# Patient Record
Sex: FEMALE | Race: WHITE | HISPANIC OR LATINO | URBAN - METROPOLITAN AREA
[De-identification: names, ages, dates, MRNs, and addresses within clinical notes are randomized per-mention and may not be internally consistent; named-entity substitution may affect disease eponyms.]

---

## 2018-02-23 ENCOUNTER — OUTPATIENT (OUTPATIENT)
Dept: OUTPATIENT SERVICES | Facility: HOSPITAL | Age: 58
LOS: 1 days | Discharge: HOME | End: 2018-02-23

## 2018-02-26 DIAGNOSIS — R89.7 ABNORMAL HISTOLOGICAL FINDINGS IN SPECIMENS FROM OTHER ORGANS, SYSTEMS AND TISSUES: ICD-10-CM

## 2018-03-02 ENCOUNTER — RESULT REVIEW (OUTPATIENT)
Age: 58
End: 2018-03-02

## 2018-12-14 ENCOUNTER — EMERGENCY (EMERGENCY)
Facility: HOSPITAL | Age: 58
LOS: 0 days | Discharge: HOME | End: 2018-12-14
Attending: EMERGENCY MEDICINE | Admitting: EMERGENCY MEDICINE

## 2018-12-14 VITALS
DIASTOLIC BLOOD PRESSURE: 90 MMHG | SYSTOLIC BLOOD PRESSURE: 140 MMHG | OXYGEN SATURATION: 100 % | TEMPERATURE: 98 F | RESPIRATION RATE: 18 BRPM | HEART RATE: 72 BPM

## 2018-12-14 VITALS
SYSTOLIC BLOOD PRESSURE: 120 MMHG | HEART RATE: 62 BPM | DIASTOLIC BLOOD PRESSURE: 65 MMHG | TEMPERATURE: 97 F | OXYGEN SATURATION: 100 % | RESPIRATION RATE: 18 BRPM

## 2018-12-14 DIAGNOSIS — I10 ESSENTIAL (PRIMARY) HYPERTENSION: ICD-10-CM
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
## 2018-12-14 DIAGNOSIS — S00.83XA CONTUSION OF OTHER PART OF HEAD, INITIAL ENCOUNTER: ICD-10-CM

## 2018-12-14 DIAGNOSIS — R55 SYNCOPE AND COLLAPSE: ICD-10-CM

## 2018-12-14 DIAGNOSIS — R19.7 DIARRHEA, UNSPECIFIED: ICD-10-CM

## 2018-12-14 DIAGNOSIS — Z91.013 ALLERGY TO SEAFOOD: ICD-10-CM

## 2018-12-14 DIAGNOSIS — R11.2 NAUSEA WITH VOMITING, UNSPECIFIED: ICD-10-CM
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
## 2018-12-14 DIAGNOSIS — S00.33XA CONTUSION OF NOSE, INITIAL ENCOUNTER: ICD-10-CM

## 2018-12-14 LAB
ALBUMIN SERPL ELPH-MCNC: 4.8 G/DL — SIGNIFICANT CHANGE UP (ref 3.5–5.2)
ALP SERPL-CCNC: 90 U/L — SIGNIFICANT CHANGE UP (ref 30–115)
ALT FLD-CCNC: 26 U/L — SIGNIFICANT CHANGE UP (ref 0–41)
ANION GAP SERPL CALC-SCNC: 17 MMOL/L — HIGH (ref 7–14)
AST SERPL-CCNC: 38 U/L — SIGNIFICANT CHANGE UP (ref 0–41)
BASOPHILS # BLD AUTO: 0.04 K/UL — SIGNIFICANT CHANGE UP (ref 0–0.2)
BASOPHILS NFR BLD AUTO: 0.3 % — SIGNIFICANT CHANGE UP (ref 0–1)
BILIRUB SERPL-MCNC: <0.2 MG/DL — SIGNIFICANT CHANGE UP (ref 0.2–1.2)
BUN SERPL-MCNC: 20 MG/DL — SIGNIFICANT CHANGE UP (ref 10–20)
CALCIUM SERPL-MCNC: 10 MG/DL — SIGNIFICANT CHANGE UP (ref 8.5–10.1)
CHLORIDE SERPL-SCNC: 100 MMOL/L — SIGNIFICANT CHANGE UP (ref 98–110)
CO2 SERPL-SCNC: 25 MMOL/L — SIGNIFICANT CHANGE UP (ref 17–32)
CREAT SERPL-MCNC: 0.9 MG/DL — SIGNIFICANT CHANGE UP (ref 0.7–1.5)
EOSINOPHIL # BLD AUTO: 0.1 K/UL — SIGNIFICANT CHANGE UP (ref 0–0.7)
EOSINOPHIL NFR BLD AUTO: 0.8 % — SIGNIFICANT CHANGE UP (ref 0–8)
GLUCOSE SERPL-MCNC: 121 MG/DL — HIGH (ref 70–99)
HCT VFR BLD CALC: 41.5 % — SIGNIFICANT CHANGE UP (ref 37–47)
HGB BLD-MCNC: 14 G/DL — SIGNIFICANT CHANGE UP (ref 12–16)
IMM GRANULOCYTES NFR BLD AUTO: 0.4 % — HIGH (ref 0.1–0.3)
LIDOCAIN IGE QN: 48 U/L — SIGNIFICANT CHANGE UP (ref 7–60)
LYMPHOCYTES # BLD AUTO: 18.6 % — LOW (ref 20.5–51.1)
LYMPHOCYTES # BLD AUTO: 2.46 K/UL — SIGNIFICANT CHANGE UP (ref 1.2–3.4)
MCHC RBC-ENTMCNC: 29.3 PG — SIGNIFICANT CHANGE UP (ref 27–31)
MCHC RBC-ENTMCNC: 33.7 G/DL — SIGNIFICANT CHANGE UP (ref 32–37)
MCV RBC AUTO: 86.8 FL — SIGNIFICANT CHANGE UP (ref 81–99)
MONOCYTES # BLD AUTO: 0.54 K/UL — SIGNIFICANT CHANGE UP (ref 0.1–0.6)
MONOCYTES NFR BLD AUTO: 4.1 % — SIGNIFICANT CHANGE UP (ref 1.7–9.3)
NEUTROPHILS # BLD AUTO: 10.07 K/UL — HIGH (ref 1.4–6.5)
NEUTROPHILS NFR BLD AUTO: 75.8 % — HIGH (ref 42.2–75.2)
NRBC # BLD: 0 /100 WBCS — SIGNIFICANT CHANGE UP (ref 0–0)
PLATELET # BLD AUTO: 215 K/UL — SIGNIFICANT CHANGE UP (ref 130–400)
POTASSIUM SERPL-MCNC: 4 MMOL/L — SIGNIFICANT CHANGE UP (ref 3.5–5)
POTASSIUM SERPL-SCNC: 4 MMOL/L — SIGNIFICANT CHANGE UP (ref 3.5–5)
PROT SERPL-MCNC: 7.6 G/DL — SIGNIFICANT CHANGE UP (ref 6–8)
RBC # BLD: 4.78 M/UL — SIGNIFICANT CHANGE UP (ref 4.2–5.4)
RBC # FLD: 12.7 % — SIGNIFICANT CHANGE UP (ref 11.5–14.5)
SODIUM SERPL-SCNC: 142 MMOL/L — SIGNIFICANT CHANGE UP (ref 135–146)
TROPONIN T SERPL-MCNC: <0.01 NG/ML — SIGNIFICANT CHANGE UP
WBC # BLD: 13.26 K/UL — HIGH (ref 4.8–10.8)
WBC # FLD AUTO: 13.26 K/UL — HIGH (ref 4.8–10.8)

## 2018-12-14 RX ORDER — SODIUM CHLORIDE 9 MG/ML
1000 INJECTION INTRAMUSCULAR; INTRAVENOUS; SUBCUTANEOUS ONCE
Qty: 0 | Refills: 0 | Status: COMPLETED | OUTPATIENT
Start: 2018-12-14 | End: 2018-12-14

## 2018-12-14 RX ORDER — ONDANSETRON 8 MG/1
4 TABLET, FILM COATED ORAL ONCE
Qty: 0 | Refills: 0 | Status: COMPLETED | OUTPATIENT
Start: 2018-12-14 | End: 2018-12-14

## 2018-12-14 RX ORDER — ACETAMINOPHEN 500 MG
650 TABLET ORAL ONCE
Qty: 0 | Refills: 0 | Status: COMPLETED | OUTPATIENT
Start: 2018-12-14 | End: 2018-12-14

## 2018-12-14 RX ADMIN — ONDANSETRON 4 MILLIGRAM(S): 8 TABLET, FILM COATED ORAL at 18:28

## 2018-12-14 RX ADMIN — SODIUM CHLORIDE 1000 MILLILITER(S): 9 INJECTION INTRAMUSCULAR; INTRAVENOUS; SUBCUTANEOUS at 18:28

## 2018-12-14 RX ADMIN — Medication 650 MILLIGRAM(S): at 18:28

## 2018-12-14 NOTE — ED PROVIDER NOTE - MEDICAL DECISION MAKING DETAILS
pt here with syncopal episode after what she believes to be food poisoning.  pt states that other people who ate the food also got sick.    Pt reports diarrhea, abd crampingin and then feeling lightheaded and had syncopal episode and vomiting.  no cp, no sob.  While in the ED pt had NO Symptoms.  no cp, no sob, no abd pain, no headache, no back pain, no numbness, no weakness.  Pt hit her head.  CThead negative.  pt not on anticoagulation.  pt feeling much better.  pt ambulating around the ED.  work up negative for anything acute.  pt DC with outpatient follow up

## 2018-12-14 NOTE — ED PROVIDER NOTE - OBJECTIVE STATEMENT
58 year old female with pmhx of HTN, presents with abdominal cramping and diarrhea s/.p eating out at [employer]. Pt admits had lunch, went kourtney to office, and had sudden diarrhea in bathroom. Pt admits felt lightheaded after bowel movement and passed out, hit front of face. pt admits to few episodes of vomiting after syncope. Pt admits other workers with GI issues too after lunch. Pt denies fever, chills, CP, SOB, or urinary symptoms.

## 2018-12-14 NOTE — ED PROVIDER NOTE - PHYSICAL EXAMINATION
CONST: Well appearing in NAD  EYES: PERRL, EOMI, Sclera and conjunctiva clear.   ENT: No nasal discharge. Oropharynx normal appearing, no erythema or exudates. No abscess or swelling. Uvula midline. No temporal artery or mastoid tenderness.  NECK: Non-tender, no meningeal signs. normal ROM. supple   CARD: Normal S1 S2; Normal rate and rhythm  RESP: Equal BS B/L, No wheezes, rhonchi or rales. No distress  GI: Soft, non-tender, non-distended. no cva tenderness. normal BS  MS: Normal ROM in all extremities. No midline spinal tenderness. pulses 2 +. no calf tenderness or swelling  SKIN: + small bruise to forehead and nose   NEURO: A&Ox3, No focal deficits. Strength 5/5 with no sensory deficits. Steady gait. Finger to nose intact. Negative pronator drift

## 2018-12-14 NOTE — ED PROVIDER NOTE - NS ED ROS FT
Review of Systems:  	•	CONSTITUTIONAL - no fever, no diaphoresis, no chills  	•	SKIN - no rash  	•	HEMATOLOGIC - no bleeding, no bruising  	•	EYES - no eye pain, no blurry vision  	•	ENT - no change in hearing, no sore throat, no ear pain or tinnitus  	•	RESPIRATORY - no shortness of breath, no cough  	•	CARDIAC - no chest pain, no palpitations  	•	GI - no abd pain, no nausea, + vomiting, + diarrhea, no constipation  	•	GENITO-URINARY - no discharge, no dysuria; no hematuria, no increased urinary frequency  	•	MUSCULOSKELETAL - no joint paint, no swelling, no redness  	•	NEUROLOGIC - no weakness, no headache, no paresthesias

## 2018-12-14 NOTE — ED PROVIDER NOTE - ATTENDING CONTRIBUTION TO CARE
59 yo f presents s/p syncopal episode pt reports she got lunch and shortly after lunch had episode of abd cramping, diarrhea, sweating, vomiting and then fewlt lightheadaded and had syncopal episode.  pt hit her head. no numbness, no weakness.  pt is on aspirin.  no other blood thinners.  currently pt feels fine.  no cp, nos ob, no abd pain, no back pain, no nausea, no vomiting, no fevers, no chills.  no numbness, no weakness.  pt has NO ABD PAIN.  awake, alert.  neck supple.  no midline vertebral tenderness.  eomi.  motor/;sensaiton intact in all 4 ext.  ecchymosis to forehead and nose.  no epistaxis, no malocclusion.  no active bleeding.  abd soft, nontender.  lungs clear.  a/p:  vasovagal syncope and head injury.  p:  ct, labs, cxr, ekg, ivf, reassess.

## 2018-12-14 NOTE — ED ADULT TRIAGE NOTE - CHIEF COMPLAINT QUOTE
Pt BIBA was out to eat became suddenly ill, diarrhea & vomiting, passed out, hit her head. Not on any blood thinners, feeling better now.

## 2019-05-22 ENCOUNTER — RESULT REVIEW (OUTPATIENT)
Age: 59
End: 2019-05-22

## 2019-05-31 NOTE — ED ADULT NURSE NOTE - PAIN: BODY LOCATION
HISTORY OF PRESENT ILLNESS  Carine Landon is a 37 y.o. female. Pt. comes in for f/u. Has multiple medical problems. Continues to have chronic bilateral knee pain with arthritis. Followed by orthopedic MD who has recommended surgery. Also has chronic back pain with lumbar arthritis. Reports a lot of joint stiffness and muscle aches as well. Relates all of this to trauma and repetitive work when she was back in [place]. Continues to have some GERD issues. Protonix helps. Has chronic depression and PTSD. She is . Has young kids. Does not speak [place]. Came here as a refugee. Has been working but it denies tobacco or alcohol use. Is very difficult for her to be standing for long periods of time or walk. Reports compliance with medications and diet. Med list and most recent labs/studies reviewed with pt. Trying to be active physically to as tolerated . Reports no other new c/o. HPI    Review of Systems   Constitutional: Negative. HENT: Negative. Eyes: Negative. Respiratory: Negative for shortness of breath. Cardiovascular: Negative for chest pain and leg swelling. Gastrointestinal: Positive for heartburn. Negative for abdominal pain. Genitourinary: Negative for dysuria and frequency. Musculoskeletal: Positive for back pain, joint pain and myalgias. Negative for falls and neck pain. Skin: Negative. Neurological: Negative for dizziness, sensory change, focal weakness and headaches. Psychiatric/Behavioral: Positive for depression. The patient is nervous/anxious and has insomnia. All other systems reviewed and are negative. Physical Exam   Constitutional: She is oriented to person, place, and time. She appears well-developed and well-nourished. No distress. HENT:   Head: Normocephalic and atraumatic. Mouth/Throat: Oropharynx is clear and moist.   Eyes: Conjunctivae are normal.   Neck: Normal range of motion. Neck supple. No JVD present. No thyromegaly present. Cardiovascular: Normal rate, regular rhythm and intact distal pulses. No murmur heard. Pulmonary/Chest: Effort normal and breath sounds normal. No respiratory distress. She has no wheezes. She has no rales. Abdominal: Soft. Bowel sounds are normal. She exhibits no distension. There is no tenderness. Musculoskeletal: She exhibits tenderness (Bilateral knees, lumbars, chronic). She exhibits no edema. DJD   Neurological: She is alert and oriented to person, place, and time. Coordination normal.   Skin: Skin is warm and dry. No rash noted. Psychiatric: Her behavior is normal.   Chronically depressed/stressed   Nursing note and vitals reviewed. ASSESSMENT and PLAN  Diagnoses and all orders for this visit:    1. Chronic pain of both knees    2. Arthritis of lumbar spine    3. Chronic midline back pain, unspecified back location    4. Gastroesophageal reflux disease without esophagitis    5. PTSD (post-traumatic stress disorder)      Follow-up and Dispositions    · Return in about 3 months (around 8/14/2019).      lab results and schedule of future lab studies reviewed with patient  reviewed diet, exercise and weight control  reviewed medications and side effects in detail  F/u with other MD's as scheduled  She is medically stable for knee surgery face

## 2019-07-23 ENCOUNTER — OUTPATIENT (OUTPATIENT)
Dept: OUTPATIENT SERVICES | Facility: HOSPITAL | Age: 59
LOS: 1 days | Discharge: HOME | End: 2019-07-23
Payer: OTHER MISCELLANEOUS

## 2019-07-23 VITALS
TEMPERATURE: 98 F | DIASTOLIC BLOOD PRESSURE: 78 MMHG | SYSTOLIC BLOOD PRESSURE: 118 MMHG | OXYGEN SATURATION: 100 % | HEART RATE: 67 BPM | RESPIRATION RATE: 16 BRPM | WEIGHT: 201.94 LBS | HEIGHT: 64 IN

## 2019-07-23 DIAGNOSIS — M17.12 UNILATERAL PRIMARY OSTEOARTHRITIS, LEFT KNEE: ICD-10-CM

## 2019-07-23 DIAGNOSIS — J34.2 DEVIATED NASAL SEPTUM: Chronic | ICD-10-CM

## 2019-07-23 DIAGNOSIS — Z98.890 OTHER SPECIFIED POSTPROCEDURAL STATES: Chronic | ICD-10-CM

## 2019-07-23 DIAGNOSIS — Z01.818 ENCOUNTER FOR OTHER PREPROCEDURAL EXAMINATION: ICD-10-CM

## 2019-07-23 DIAGNOSIS — Z98.51 TUBAL LIGATION STATUS: Chronic | ICD-10-CM

## 2019-07-23 LAB
ALBUMIN SERPL ELPH-MCNC: 4.5 G/DL — SIGNIFICANT CHANGE UP (ref 3.5–5.2)
ALP SERPL-CCNC: 74 U/L — SIGNIFICANT CHANGE UP (ref 30–115)
ALT FLD-CCNC: 24 U/L — SIGNIFICANT CHANGE UP (ref 0–41)
ANION GAP SERPL CALC-SCNC: 12 MMOL/L — SIGNIFICANT CHANGE UP (ref 7–14)
APPEARANCE UR: CLEAR — SIGNIFICANT CHANGE UP
APTT BLD: 36.4 SEC — SIGNIFICANT CHANGE UP (ref 27–39.2)
AST SERPL-CCNC: 34 U/L — SIGNIFICANT CHANGE UP (ref 0–41)
BASOPHILS # BLD AUTO: 0.06 K/UL — SIGNIFICANT CHANGE UP (ref 0–0.2)
BASOPHILS NFR BLD AUTO: 0.8 % — SIGNIFICANT CHANGE UP (ref 0–1)
BILIRUB SERPL-MCNC: 0.3 MG/DL — SIGNIFICANT CHANGE UP (ref 0.2–1.2)
BILIRUB UR-MCNC: NEGATIVE — SIGNIFICANT CHANGE UP
BLD GP AB SCN SERPL QL: SIGNIFICANT CHANGE UP
BUN SERPL-MCNC: 16 MG/DL — SIGNIFICANT CHANGE UP (ref 10–20)
CALCIUM SERPL-MCNC: 9.8 MG/DL — SIGNIFICANT CHANGE UP (ref 8.5–10.1)
CHLORIDE SERPL-SCNC: 103 MMOL/L — SIGNIFICANT CHANGE UP (ref 98–110)
CO2 SERPL-SCNC: 26 MMOL/L — SIGNIFICANT CHANGE UP (ref 17–32)
COLOR SPEC: YELLOW — SIGNIFICANT CHANGE UP
CREAT SERPL-MCNC: 0.9 MG/DL — SIGNIFICANT CHANGE UP (ref 0.7–1.5)
DIFF PNL FLD: NEGATIVE — SIGNIFICANT CHANGE UP
EOSINOPHIL # BLD AUTO: 0.22 K/UL — SIGNIFICANT CHANGE UP (ref 0–0.7)
EOSINOPHIL NFR BLD AUTO: 3.1 % — SIGNIFICANT CHANGE UP (ref 0–8)
ESTIMATED AVERAGE GLUCOSE: 117 MG/DL — HIGH (ref 68–114)
GLUCOSE SERPL-MCNC: 105 MG/DL — HIGH (ref 70–99)
GLUCOSE UR QL: NEGATIVE — SIGNIFICANT CHANGE UP
HBA1C BLD-MCNC: 5.7 % — HIGH (ref 4–5.6)
HCT VFR BLD CALC: 40.8 % — SIGNIFICANT CHANGE UP (ref 37–47)
HGB BLD-MCNC: 13.8 G/DL — SIGNIFICANT CHANGE UP (ref 12–16)
IMM GRANULOCYTES NFR BLD AUTO: 0.3 % — SIGNIFICANT CHANGE UP (ref 0.1–0.3)
INR BLD: 1.05 RATIO — SIGNIFICANT CHANGE UP (ref 0.65–1.3)
KETONES UR-MCNC: NEGATIVE — SIGNIFICANT CHANGE UP
LEUKOCYTE ESTERASE UR-ACNC: NEGATIVE — SIGNIFICANT CHANGE UP
LYMPHOCYTES # BLD AUTO: 2.65 K/UL — SIGNIFICANT CHANGE UP (ref 1.2–3.4)
LYMPHOCYTES # BLD AUTO: 37.5 % — SIGNIFICANT CHANGE UP (ref 20.5–51.1)
MCHC RBC-ENTMCNC: 29.9 PG — SIGNIFICANT CHANGE UP (ref 27–31)
MCHC RBC-ENTMCNC: 33.8 G/DL — SIGNIFICANT CHANGE UP (ref 32–37)
MCV RBC AUTO: 88.3 FL — SIGNIFICANT CHANGE UP (ref 81–99)
MONOCYTES # BLD AUTO: 0.5 K/UL — SIGNIFICANT CHANGE UP (ref 0.1–0.6)
MONOCYTES NFR BLD AUTO: 7.1 % — SIGNIFICANT CHANGE UP (ref 1.7–9.3)
MRSA PCR RESULT.: NEGATIVE — SIGNIFICANT CHANGE UP
NEUTROPHILS # BLD AUTO: 3.62 K/UL — SIGNIFICANT CHANGE UP (ref 1.4–6.5)
NEUTROPHILS NFR BLD AUTO: 51.2 % — SIGNIFICANT CHANGE UP (ref 42.2–75.2)
NITRITE UR-MCNC: NEGATIVE — SIGNIFICANT CHANGE UP
NRBC # BLD: 0 /100 WBCS — SIGNIFICANT CHANGE UP (ref 0–0)
PH UR: 6 — SIGNIFICANT CHANGE UP (ref 5–8)
PLATELET # BLD AUTO: 207 K/UL — SIGNIFICANT CHANGE UP (ref 130–400)
POTASSIUM SERPL-MCNC: 4.1 MMOL/L — SIGNIFICANT CHANGE UP (ref 3.5–5)
POTASSIUM SERPL-SCNC: 4.1 MMOL/L — SIGNIFICANT CHANGE UP (ref 3.5–5)
PROT SERPL-MCNC: 7.4 G/DL — SIGNIFICANT CHANGE UP (ref 6–8)
PROT UR-MCNC: SIGNIFICANT CHANGE UP
PROTHROM AB SERPL-ACNC: 12.1 SEC — SIGNIFICANT CHANGE UP (ref 9.95–12.87)
RBC # BLD: 4.62 M/UL — SIGNIFICANT CHANGE UP (ref 4.2–5.4)
RBC # FLD: 12.5 % — SIGNIFICANT CHANGE UP (ref 11.5–14.5)
SODIUM SERPL-SCNC: 141 MMOL/L — SIGNIFICANT CHANGE UP (ref 135–146)
SP GR SPEC: 1.03 — HIGH (ref 1.01–1.02)
UROBILINOGEN FLD QL: SIGNIFICANT CHANGE UP
WBC # BLD: 7.07 K/UL — SIGNIFICANT CHANGE UP (ref 4.8–10.8)
WBC # FLD AUTO: 7.07 K/UL — SIGNIFICANT CHANGE UP (ref 4.8–10.8)

## 2019-07-23 PROCEDURE — 73560 X-RAY EXAM OF KNEE 1 OR 2: CPT | Mod: 26,LT

## 2019-07-23 PROCEDURE — 93010 ELECTROCARDIOGRAM REPORT: CPT

## 2019-07-23 PROCEDURE — 71046 X-RAY EXAM CHEST 2 VIEWS: CPT | Mod: 26

## 2019-07-23 NOTE — H&P PST ADULT - RS GEN PE MLT RESP DETAILS PC
airway patent/breath sounds equal/normal/respirations non-labored/no chest wall tenderness/good air movement/clear to auscultation bilaterally/no intercostal retractions

## 2019-07-23 NOTE — H&P PST ADULT - ATTENDING COMMENTS
Risks, benefits and alternative to surgical management of the patient's arthritic left knee were again reviewed with patient, her questions answered to her satisfaction and she wishes to proceed with proposed surgery today.

## 2019-07-23 NOTE — H&P PST ADULT - REASON FOR ADMISSION
PT PRESENTS TO PAST WITH NO SOB, CP, PALPITATIONS, DYSURIA, UTI OR URI AT PRESENT.   PT ABLE TO WALK UP 2-3 FLIGHTS OF STEPS WITH NO SOB.  AS PER THE PT, THIS IS HIS/HER COMPLETE MEDICAL AND SURGICAL HX, INCLUDING MEDICATIONS PRESCRIBED AND OVER THE COUNTER  PT PRESENTS TO PAST WITH H/O-    LEFT KNEE OA

## 2019-07-23 NOTE — H&P PST ADULT - NSICDXPASTMEDICALHX_GEN_ALL_CORE_FT
PAST MEDICAL HISTORY:  Backache symptom     DM (diabetes mellitus)     H/O hiatal hernia     Hypertension     Mild asthma LAST EPISODE 1 YEAR AGP    Murmur, heart CHILDHOOD    OA (osteoarthritis)

## 2019-07-23 NOTE — H&P PST ADULT - NSICDXPASTSURGICALHX_GEN_ALL_CORE_FT
PAST SURGICAL HISTORY:  Deviated septum     H/O arthroscopic knee surgery B/L    H/O carpal tunnel repair B/L    H/O tubal ligation

## 2019-07-24 LAB
CULTURE RESULTS: SIGNIFICANT CHANGE UP
SPECIMEN SOURCE: SIGNIFICANT CHANGE UP

## 2019-08-12 ENCOUNTER — INPATIENT (INPATIENT)
Facility: HOSPITAL | Age: 59
LOS: 1 days | Discharge: HOME HEALTH | End: 2019-08-14
Attending: ORTHOPAEDIC SURGERY | Admitting: ORTHOPAEDIC SURGERY
Payer: OTHER MISCELLANEOUS

## 2019-08-12 ENCOUNTER — RESULT REVIEW (OUTPATIENT)
Age: 59
End: 2019-08-12

## 2019-08-12 VITALS
WEIGHT: 201.94 LBS | RESPIRATION RATE: 18 BRPM | DIASTOLIC BLOOD PRESSURE: 82 MMHG | HEIGHT: 64.5 IN | SYSTOLIC BLOOD PRESSURE: 155 MMHG | HEART RATE: 66 BPM | TEMPERATURE: 98 F

## 2019-08-12 DIAGNOSIS — J34.2 DEVIATED NASAL SEPTUM: Chronic | ICD-10-CM

## 2019-08-12 DIAGNOSIS — Z90.89 ACQUIRED ABSENCE OF OTHER ORGANS: Chronic | ICD-10-CM

## 2019-08-12 DIAGNOSIS — Z98.890 OTHER SPECIFIED POSTPROCEDURAL STATES: Chronic | ICD-10-CM

## 2019-08-12 DIAGNOSIS — Z98.51 TUBAL LIGATION STATUS: Chronic | ICD-10-CM

## 2019-08-12 LAB
GLUCOSE BLDC GLUCOMTR-MCNC: 101 MG/DL — HIGH (ref 70–99)
GLUCOSE BLDC GLUCOMTR-MCNC: 105 MG/DL — HIGH (ref 70–99)
GLUCOSE BLDC GLUCOMTR-MCNC: 113 MG/DL — HIGH (ref 70–99)
GLUCOSE BLDC GLUCOMTR-MCNC: 99 MG/DL — SIGNIFICANT CHANGE UP (ref 70–99)

## 2019-08-12 PROCEDURE — 88311 DECALCIFY TISSUE: CPT | Mod: 26

## 2019-08-12 PROCEDURE — 88305 TISSUE EXAM BY PATHOLOGIST: CPT | Mod: 26

## 2019-08-12 PROCEDURE — 73560 X-RAY EXAM OF KNEE 1 OR 2: CPT | Mod: 26,LT

## 2019-08-12 RX ORDER — ONDANSETRON 8 MG/1
4 TABLET, FILM COATED ORAL EVERY 6 HOURS
Refills: 0 | Status: DISCONTINUED | OUTPATIENT
Start: 2019-08-12 | End: 2019-08-14

## 2019-08-12 RX ORDER — METOPROLOL TARTRATE 50 MG
100 TABLET ORAL
Refills: 0 | Status: DISCONTINUED | OUTPATIENT
Start: 2019-08-13 | End: 2019-08-14

## 2019-08-12 RX ORDER — TRAMADOL HYDROCHLORIDE 50 MG/1
50 TABLET ORAL EVERY 4 HOURS
Refills: 0 | Status: DISCONTINUED | OUTPATIENT
Start: 2019-08-12 | End: 2019-08-14

## 2019-08-12 RX ORDER — ONDANSETRON 8 MG/1
4 TABLET, FILM COATED ORAL ONCE
Refills: 0 | Status: ON HOLD | OUTPATIENT
Start: 2019-08-12

## 2019-08-12 RX ORDER — HYDROCHLOROTHIAZIDE 25 MG
12.5 TABLET ORAL DAILY
Refills: 0 | Status: DISCONTINUED | OUTPATIENT
Start: 2019-08-13 | End: 2019-08-14

## 2019-08-12 RX ORDER — ENOXAPARIN SODIUM 100 MG/ML
40 INJECTION SUBCUTANEOUS DAILY
Refills: 0 | Status: DISCONTINUED | OUTPATIENT
Start: 2019-08-13 | End: 2019-08-14

## 2019-08-12 RX ORDER — LISINOPRIL 2.5 MG/1
40 TABLET ORAL DAILY
Refills: 0 | Status: DISCONTINUED | OUTPATIENT
Start: 2019-08-13 | End: 2019-08-14

## 2019-08-12 RX ORDER — METOPROLOL TARTRATE 50 MG
1 TABLET ORAL
Qty: 0 | Refills: 0 | DISCHARGE

## 2019-08-12 RX ORDER — DEXTROSE 50 % IN WATER 50 %
25 SYRINGE (ML) INTRAVENOUS ONCE
Refills: 0 | Status: DISCONTINUED | OUTPATIENT
Start: 2019-08-12 | End: 2019-08-14

## 2019-08-12 RX ORDER — GLUCAGON INJECTION, SOLUTION 0.5 MG/.1ML
1 INJECTION, SOLUTION SUBCUTANEOUS ONCE
Refills: 0 | Status: DISCONTINUED | OUTPATIENT
Start: 2019-08-12 | End: 2019-08-14

## 2019-08-12 RX ORDER — SENNA PLUS 8.6 MG/1
2 TABLET ORAL AT BEDTIME
Refills: 0 | Status: DISCONTINUED | OUTPATIENT
Start: 2019-08-12 | End: 2019-08-14

## 2019-08-12 RX ORDER — DULAGLUTIDE 4.5 MG/.5ML
0 INJECTION, SOLUTION SUBCUTANEOUS
Qty: 0 | Refills: 0 | DISCHARGE

## 2019-08-12 RX ORDER — SODIUM CHLORIDE 9 MG/ML
1000 INJECTION, SOLUTION INTRAVENOUS
Refills: 0 | Status: DISCONTINUED | OUTPATIENT
Start: 2019-08-12 | End: 2019-08-14

## 2019-08-12 RX ORDER — POLYETHYLENE GLYCOL 3350 17 G/17G
17 POWDER, FOR SOLUTION ORAL DAILY
Refills: 0 | Status: DISCONTINUED | OUTPATIENT
Start: 2019-08-12 | End: 2019-08-14

## 2019-08-12 RX ORDER — CELECOXIB 200 MG/1
400 CAPSULE ORAL ONCE
Refills: 0 | Status: COMPLETED | OUTPATIENT
Start: 2019-08-12 | End: 2019-08-12

## 2019-08-12 RX ORDER — ACETAMINOPHEN 500 MG
1000 TABLET ORAL ONCE
Refills: 0 | Status: COMPLETED | OUTPATIENT
Start: 2019-08-12 | End: 2019-08-12

## 2019-08-12 RX ORDER — GABAPENTIN 400 MG/1
300 CAPSULE ORAL ONCE
Refills: 0 | Status: COMPLETED | OUTPATIENT
Start: 2019-08-12 | End: 2019-08-12

## 2019-08-12 RX ORDER — SODIUM CHLORIDE 9 MG/ML
1000 INJECTION, SOLUTION INTRAVENOUS
Refills: 0 | Status: ON HOLD | OUTPATIENT
Start: 2019-08-12

## 2019-08-12 RX ORDER — DEXTROSE 50 % IN WATER 50 %
12.5 SYRINGE (ML) INTRAVENOUS ONCE
Refills: 0 | Status: DISCONTINUED | OUTPATIENT
Start: 2019-08-12 | End: 2019-08-14

## 2019-08-12 RX ORDER — MAGNESIUM HYDROXIDE 400 MG/1
30 TABLET, CHEWABLE ORAL DAILY
Refills: 0 | Status: DISCONTINUED | OUTPATIENT
Start: 2019-08-12 | End: 2019-08-14

## 2019-08-12 RX ORDER — LISINOPRIL 2.5 MG/1
1 TABLET ORAL
Qty: 0 | Refills: 0 | DISCHARGE

## 2019-08-12 RX ORDER — KETOROLAC TROMETHAMINE 30 MG/ML
15 SYRINGE (ML) INJECTION EVERY 6 HOURS
Refills: 0 | Status: DISCONTINUED | OUTPATIENT
Start: 2019-08-13 | End: 2019-08-13

## 2019-08-12 RX ORDER — GABAPENTIN 400 MG/1
100 CAPSULE ORAL EVERY 8 HOURS
Refills: 0 | Status: DISCONTINUED | OUTPATIENT
Start: 2019-08-12 | End: 2019-08-14

## 2019-08-12 RX ORDER — HYDROMORPHONE HYDROCHLORIDE 2 MG/ML
1 INJECTION INTRAMUSCULAR; INTRAVENOUS; SUBCUTANEOUS
Refills: 0 | Status: ON HOLD | OUTPATIENT
Start: 2019-08-12

## 2019-08-12 RX ORDER — ATORVASTATIN CALCIUM 80 MG/1
80 TABLET, FILM COATED ORAL AT BEDTIME
Refills: 0 | Status: DISCONTINUED | OUTPATIENT
Start: 2019-08-12 | End: 2019-08-14

## 2019-08-12 RX ORDER — CEFAZOLIN SODIUM 1 G
2000 VIAL (EA) INJECTION EVERY 8 HOURS
Refills: 0 | Status: COMPLETED | OUTPATIENT
Start: 2019-08-12 | End: 2019-08-13

## 2019-08-12 RX ORDER — SODIUM CHLORIDE 9 MG/ML
1000 INJECTION, SOLUTION INTRAVENOUS
Refills: 0 | Status: DISCONTINUED | OUTPATIENT
Start: 2019-08-12 | End: 2019-08-12

## 2019-08-12 RX ORDER — ACETAMINOPHEN 500 MG
650 TABLET ORAL EVERY 6 HOURS
Refills: 0 | Status: DISCONTINUED | OUTPATIENT
Start: 2019-08-12 | End: 2019-08-14

## 2019-08-12 RX ORDER — HYDROMORPHONE HYDROCHLORIDE 2 MG/ML
0.5 INJECTION INTRAMUSCULAR; INTRAVENOUS; SUBCUTANEOUS
Refills: 0 | Status: DISCONTINUED | OUTPATIENT
Start: 2019-08-12 | End: 2019-08-12

## 2019-08-12 RX ORDER — HYDROMORPHONE HYDROCHLORIDE 2 MG/ML
0.5 INJECTION INTRAMUSCULAR; INTRAVENOUS; SUBCUTANEOUS
Refills: 0 | Status: ON HOLD | OUTPATIENT
Start: 2019-08-12

## 2019-08-12 RX ORDER — ALBUTEROL 90 UG/1
2 AEROSOL, METERED ORAL EVERY 6 HOURS
Refills: 0 | Status: DISCONTINUED | OUTPATIENT
Start: 2019-08-12 | End: 2019-08-14

## 2019-08-12 RX ORDER — DOCUSATE SODIUM 100 MG
100 CAPSULE ORAL THREE TIMES A DAY
Refills: 0 | Status: DISCONTINUED | OUTPATIENT
Start: 2019-08-12 | End: 2019-08-14

## 2019-08-12 RX ORDER — DEXTROSE 50 % IN WATER 50 %
15 SYRINGE (ML) INTRAVENOUS ONCE
Refills: 0 | Status: DISCONTINUED | OUTPATIENT
Start: 2019-08-12 | End: 2019-08-14

## 2019-08-12 RX ORDER — CHLORHEXIDINE GLUCONATE 213 G/1000ML
1 SOLUTION TOPICAL DAILY
Refills: 0 | Status: DISCONTINUED | OUTPATIENT
Start: 2019-08-12 | End: 2019-08-14

## 2019-08-12 RX ORDER — SODIUM CHLORIDE 9 MG/ML
1000 INJECTION INTRAMUSCULAR; INTRAVENOUS; SUBCUTANEOUS
Refills: 0 | Status: DISCONTINUED | OUTPATIENT
Start: 2019-08-12 | End: 2019-08-13

## 2019-08-12 RX ORDER — ONDANSETRON 8 MG/1
4 TABLET, FILM COATED ORAL ONCE
Refills: 0 | Status: DISCONTINUED | OUTPATIENT
Start: 2019-08-12 | End: 2019-08-12

## 2019-08-12 RX ORDER — INSULIN LISPRO 100/ML
VIAL (ML) SUBCUTANEOUS
Refills: 0 | Status: DISCONTINUED | OUTPATIENT
Start: 2019-08-12 | End: 2019-08-14

## 2019-08-12 RX ORDER — HYDROMORPHONE HYDROCHLORIDE 2 MG/ML
0.25 INJECTION INTRAMUSCULAR; INTRAVENOUS; SUBCUTANEOUS
Refills: 0 | Status: DISCONTINUED | OUTPATIENT
Start: 2019-08-12 | End: 2019-08-12

## 2019-08-12 RX ORDER — CELECOXIB 200 MG/1
200 CAPSULE ORAL DAILY
Refills: 0 | Status: DISCONTINUED | OUTPATIENT
Start: 2019-08-14 | End: 2019-08-14

## 2019-08-12 RX ORDER — PANTOPRAZOLE SODIUM 20 MG/1
40 TABLET, DELAYED RELEASE ORAL
Refills: 0 | Status: DISCONTINUED | OUTPATIENT
Start: 2019-08-12 | End: 2019-08-14

## 2019-08-12 RX ORDER — ROSUVASTATIN CALCIUM 5 MG/1
1 TABLET ORAL
Qty: 0 | Refills: 0 | DISCHARGE

## 2019-08-12 RX ORDER — FERROUS SULFATE 325(65) MG
325 TABLET ORAL
Refills: 0 | Status: DISCONTINUED | OUTPATIENT
Start: 2019-08-12 | End: 2019-08-14

## 2019-08-12 RX ORDER — OXYCODONE HYDROCHLORIDE 5 MG/1
5 TABLET ORAL EVERY 4 HOURS
Refills: 0 | Status: DISCONTINUED | OUTPATIENT
Start: 2019-08-12 | End: 2019-08-14

## 2019-08-12 RX ADMIN — Medication 1000 MILLIGRAM(S): at 11:26

## 2019-08-12 RX ADMIN — HYDROMORPHONE HYDROCHLORIDE 0.5 MILLIGRAM(S): 2 INJECTION INTRAMUSCULAR; INTRAVENOUS; SUBCUTANEOUS at 19:30

## 2019-08-12 RX ADMIN — Medication 100 MILLIGRAM(S): at 21:53

## 2019-08-12 RX ADMIN — HYDROMORPHONE HYDROCHLORIDE 0.5 MILLIGRAM(S): 2 INJECTION INTRAMUSCULAR; INTRAVENOUS; SUBCUTANEOUS at 19:19

## 2019-08-12 RX ADMIN — ATORVASTATIN CALCIUM 80 MILLIGRAM(S): 80 TABLET, FILM COATED ORAL at 21:53

## 2019-08-12 RX ADMIN — CELECOXIB 400 MILLIGRAM(S): 200 CAPSULE ORAL at 11:26

## 2019-08-12 RX ADMIN — OXYCODONE HYDROCHLORIDE 5 MILLIGRAM(S): 5 TABLET ORAL at 22:51

## 2019-08-12 RX ADMIN — SODIUM CHLORIDE 100 MILLILITER(S): 9 INJECTION INTRAMUSCULAR; INTRAVENOUS; SUBCUTANEOUS at 21:00

## 2019-08-12 RX ADMIN — OXYCODONE HYDROCHLORIDE 5 MILLIGRAM(S): 5 TABLET ORAL at 23:20

## 2019-08-12 RX ADMIN — GABAPENTIN 300 MILLIGRAM(S): 400 CAPSULE ORAL at 11:26

## 2019-08-12 RX ADMIN — HYDROMORPHONE HYDROCHLORIDE 0.5 MILLIGRAM(S): 2 INJECTION INTRAMUSCULAR; INTRAVENOUS; SUBCUTANEOUS at 19:03

## 2019-08-12 RX ADMIN — GABAPENTIN 100 MILLIGRAM(S): 400 CAPSULE ORAL at 21:53

## 2019-08-12 RX ADMIN — SODIUM CHLORIDE 100 MILLILITER(S): 9 INJECTION, SOLUTION INTRAVENOUS at 18:30

## 2019-08-12 RX ADMIN — HYDROMORPHONE HYDROCHLORIDE 0.5 MILLIGRAM(S): 2 INJECTION INTRAMUSCULAR; INTRAVENOUS; SUBCUTANEOUS at 19:02

## 2019-08-12 RX ADMIN — HYDROMORPHONE HYDROCHLORIDE 0.5 MILLIGRAM(S): 2 INJECTION INTRAMUSCULAR; INTRAVENOUS; SUBCUTANEOUS at 18:41

## 2019-08-12 NOTE — CHART NOTE - NSCHARTNOTEFT_GEN_A_CORE
PACU ANESTHESIA ADMISSION NOTE      Procedure: Total knee arthroplasty: CPT code 23588    Post op diagnosis:  Left knee DJD      ____  Intubated  TV:______       Rate: ______      FiO2: ______    _x___  Patent Airway    _x___  Full return of protective reflexes    __  Full recovery from anesthesia / back to baseline status    Vitals:  T-98  HR: 71  BP: 122/61  RR: 18 (08-12-19 @ 14:52) (18 - 18)  SpO2: 99    Mental Status:  _x___ Awake   _____ Alert   _____ Drowsy   _____ Sedated    Nausea/Vomiting:  _x___  NO       ______Yes,   See Post - Op Orders         Pain Scale (0-10):  __0___    Treatment: _x___ None    ____ See Post - Op/PCA Orders    Post - Operative Fluids:   __x__ Oral   ____ See Post - Op Orders    Plan: Discharge:   ____Home       ____x_Floor     _____Critical Care    _____  Other:_________________    Comments: Pt bought to RR spontaneously breathing, hemodynamically stable   No anesthesia issues or complications noted.  Discharge when criteria met.

## 2019-08-12 NOTE — ASU PATIENT PROFILE, ADULT - PMH
Backache symptom    DM (diabetes mellitus)    H/O hiatal hernia    Hypertension    Mild asthma  LAST EPISODE 1 YEAR AGP  Murmur, heart  CHILDHOOD  OA (osteoarthritis)

## 2019-08-12 NOTE — ASU PATIENT PROFILE, ADULT - PSH
Deviated septum    H/O arthroscopic knee surgery  B/L  H/O carpal tunnel repair  B/L  H/O tubal ligation    History of tonsillectomy

## 2019-08-12 NOTE — BRIEF OPERATIVE NOTE - OPERATION/FINDINGS
above, post op  - WBAY, Abx and DVT ppx per protocol  Dict:  Implants: Smith and Nephew above,  min at 300 mmHg post op  - WBAY, Abx and DVT ppx per protocol  Dict:  Implants: Smith and Nephew Kiera II size 3 femoral component size 2 tibial tray 11 tibial poly and 29 mm asymetric patella button. above,  min at 300 mmHg post op  - WBAY, Abx and DVT ppx per protocol  Dict:[id number]  Implants: Valiente and Nephew Kiera II size 3 femoral component size 2 tibial tray 11 tibial poly and 29 mm asymetric patella button.

## 2019-08-12 NOTE — PROGRESS NOTE ADULT - SUBJECTIVE AND OBJECTIVE BOX
TKA ORTHOPEDIC POST OP CHECK    T(C): 36.6 (08-12-19 @ 20:30), Max: 36.7 (08-12-19 @ 11:20)  HR: 82 (08-12-19 @ 20:30) (63 - 82)  BP: 150/71 (08-12-19 @ 20:30) (134/65 - 155/82)  RR: 20 (08-12-19 @ 20:30) (11 - 20)  SpO2: 99% (08-12-19 @ 20:30) (98% - 100%)    S/P TKA ARTHROPLASTY  PAIN CONTROLLED  VSS   A&O X3  DRESSING C/D/I  NVI  ANTIBIOTICS INFUSED  DVT PROPHYLAXIS ORDERED  ENCOURAGE INCENTIVE SPIROMETRY  AM LABS  REHAB TO BEGIN IN THE AM  D/C PLANNING

## 2019-08-13 LAB
ANION GAP SERPL CALC-SCNC: 10 MMOL/L — SIGNIFICANT CHANGE UP (ref 7–14)
BUN SERPL-MCNC: 12 MG/DL — SIGNIFICANT CHANGE UP (ref 10–20)
CALCIUM SERPL-MCNC: 8.6 MG/DL — SIGNIFICANT CHANGE UP (ref 8.5–10.1)
CHLORIDE SERPL-SCNC: 100 MMOL/L — SIGNIFICANT CHANGE UP (ref 98–110)
CO2 SERPL-SCNC: 25 MMOL/L — SIGNIFICANT CHANGE UP (ref 17–32)
CREAT SERPL-MCNC: 0.6 MG/DL — LOW (ref 0.7–1.5)
GLUCOSE BLDC GLUCOMTR-MCNC: 106 MG/DL — HIGH (ref 70–99)
GLUCOSE BLDC GLUCOMTR-MCNC: 125 MG/DL — HIGH (ref 70–99)
GLUCOSE BLDC GLUCOMTR-MCNC: 141 MG/DL — HIGH (ref 70–99)
GLUCOSE BLDC GLUCOMTR-MCNC: 149 MG/DL — HIGH (ref 70–99)
GLUCOSE SERPL-MCNC: 118 MG/DL — HIGH (ref 70–99)
HCT VFR BLD CALC: 35 % — LOW (ref 37–47)
HGB BLD-MCNC: 11.6 G/DL — LOW (ref 12–16)
MCHC RBC-ENTMCNC: 29.8 PG — SIGNIFICANT CHANGE UP (ref 27–31)
MCHC RBC-ENTMCNC: 33.1 G/DL — SIGNIFICANT CHANGE UP (ref 32–37)
MCV RBC AUTO: 90 FL — SIGNIFICANT CHANGE UP (ref 81–99)
NRBC # BLD: 0 /100 WBCS — SIGNIFICANT CHANGE UP (ref 0–0)
PLATELET # BLD AUTO: 175 K/UL — SIGNIFICANT CHANGE UP (ref 130–400)
POTASSIUM SERPL-MCNC: 4.4 MMOL/L — SIGNIFICANT CHANGE UP (ref 3.5–5)
POTASSIUM SERPL-SCNC: 4.4 MMOL/L — SIGNIFICANT CHANGE UP (ref 3.5–5)
RBC # BLD: 3.89 M/UL — LOW (ref 4.2–5.4)
RBC # FLD: 12.8 % — SIGNIFICANT CHANGE UP (ref 11.5–14.5)
SODIUM SERPL-SCNC: 135 MMOL/L — SIGNIFICANT CHANGE UP (ref 135–146)
WBC # BLD: 11.27 K/UL — HIGH (ref 4.8–10.8)
WBC # FLD AUTO: 11.27 K/UL — HIGH (ref 4.8–10.8)

## 2019-08-13 RX ADMIN — Medication 325 MILLIGRAM(S): at 08:47

## 2019-08-13 RX ADMIN — OXYCODONE HYDROCHLORIDE 5 MILLIGRAM(S): 5 TABLET ORAL at 02:52

## 2019-08-13 RX ADMIN — Medication 325 MILLIGRAM(S): at 11:17

## 2019-08-13 RX ADMIN — GABAPENTIN 100 MILLIGRAM(S): 400 CAPSULE ORAL at 13:28

## 2019-08-13 RX ADMIN — CHLORHEXIDINE GLUCONATE 1 APPLICATION(S): 213 SOLUTION TOPICAL at 11:18

## 2019-08-13 RX ADMIN — Medication 12.5 MILLIGRAM(S): at 05:14

## 2019-08-13 RX ADMIN — Medication 100 MILLIGRAM(S): at 00:22

## 2019-08-13 RX ADMIN — ONDANSETRON 4 MILLIGRAM(S): 8 TABLET, FILM COATED ORAL at 23:25

## 2019-08-13 RX ADMIN — Medication 15 MILLIGRAM(S): at 17:12

## 2019-08-13 RX ADMIN — Medication 1 TABLET(S): at 11:17

## 2019-08-13 RX ADMIN — Medication 650 MILLIGRAM(S): at 05:14

## 2019-08-13 RX ADMIN — Medication 100 MILLIGRAM(S): at 06:04

## 2019-08-13 RX ADMIN — OXYCODONE HYDROCHLORIDE 5 MILLIGRAM(S): 5 TABLET ORAL at 09:00

## 2019-08-13 RX ADMIN — GABAPENTIN 100 MILLIGRAM(S): 400 CAPSULE ORAL at 05:14

## 2019-08-13 RX ADMIN — OXYCODONE HYDROCHLORIDE 5 MILLIGRAM(S): 5 TABLET ORAL at 03:20

## 2019-08-13 RX ADMIN — Medication 650 MILLIGRAM(S): at 00:23

## 2019-08-13 RX ADMIN — Medication 650 MILLIGRAM(S): at 23:39

## 2019-08-13 RX ADMIN — Medication 100 MILLIGRAM(S): at 13:28

## 2019-08-13 RX ADMIN — ENOXAPARIN SODIUM 40 MILLIGRAM(S): 100 INJECTION SUBCUTANEOUS at 11:18

## 2019-08-13 RX ADMIN — Medication 650 MILLIGRAM(S): at 11:18

## 2019-08-13 RX ADMIN — Medication 15 MILLIGRAM(S): at 23:27

## 2019-08-13 RX ADMIN — ALBUTEROL 2 PUFF(S): 90 AEROSOL, METERED ORAL at 09:01

## 2019-08-13 RX ADMIN — Medication 15 MILLIGRAM(S): at 05:14

## 2019-08-13 RX ADMIN — LISINOPRIL 40 MILLIGRAM(S): 2.5 TABLET ORAL at 05:14

## 2019-08-13 RX ADMIN — Medication 100 MILLIGRAM(S): at 05:14

## 2019-08-13 RX ADMIN — Medication 650 MILLIGRAM(S): at 05:15

## 2019-08-13 RX ADMIN — Medication 325 MILLIGRAM(S): at 17:12

## 2019-08-13 RX ADMIN — Medication 100 MILLIGRAM(S): at 21:05

## 2019-08-13 RX ADMIN — Medication 650 MILLIGRAM(S): at 00:31

## 2019-08-13 RX ADMIN — Medication 15 MILLIGRAM(S): at 11:16

## 2019-08-13 RX ADMIN — Medication 15 MILLIGRAM(S): at 05:15

## 2019-08-13 RX ADMIN — ATORVASTATIN CALCIUM 80 MILLIGRAM(S): 80 TABLET, FILM COATED ORAL at 21:05

## 2019-08-13 RX ADMIN — OXYCODONE HYDROCHLORIDE 5 MILLIGRAM(S): 5 TABLET ORAL at 20:50

## 2019-08-13 RX ADMIN — GABAPENTIN 100 MILLIGRAM(S): 400 CAPSULE ORAL at 21:05

## 2019-08-13 RX ADMIN — POLYETHYLENE GLYCOL 3350 17 GRAM(S): 17 POWDER, FOR SOLUTION ORAL at 11:17

## 2019-08-13 RX ADMIN — PANTOPRAZOLE SODIUM 40 MILLIGRAM(S): 20 TABLET, DELAYED RELEASE ORAL at 05:14

## 2019-08-13 RX ADMIN — OXYCODONE HYDROCHLORIDE 5 MILLIGRAM(S): 5 TABLET ORAL at 13:08

## 2019-08-13 RX ADMIN — OXYCODONE HYDROCHLORIDE 5 MILLIGRAM(S): 5 TABLET ORAL at 20:22

## 2019-08-13 RX ADMIN — Medication 650 MILLIGRAM(S): at 17:11

## 2019-08-13 RX ADMIN — OXYCODONE HYDROCHLORIDE 5 MILLIGRAM(S): 5 TABLET ORAL at 09:30

## 2019-08-13 RX ADMIN — Medication 15 MILLIGRAM(S): at 23:25

## 2019-08-13 NOTE — PROGRESS NOTE ADULT - SUBJECTIVE AND OBJECTIVE BOX
Patient with left knee replacement. Ace bandage is dry and intact. Positive movement and sensation. Pain is 5/10. pain controlled. Please consult for any uncontrolled pain.

## 2019-08-13 NOTE — PHYSICAL THERAPY INITIAL EVALUATION ADULT - RANGE OF MOTION EXAMINATION, REHAB EVAL
L Knee ~8-80* aarom with ace wrap seated at edge of  bed/bilateral upper extremity ROM was WFL (within functional limits)/Right LE ROM was WFL (within functional limits)

## 2019-08-13 NOTE — OCCUPATIONAL THERAPY INITIAL EVALUATION ADULT - GENERAL OBSERVATIONS, REHAB EVAL
pt rec'd seated in b/s chair in NAD agreeable to OT evaluation, + IV lock, + L knee ace wrap, pt left seated in b/s chair with chair alarm

## 2019-08-13 NOTE — PROVIDER CONTACT NOTE (OTHER) - SITUATION
Pt came out bathroom, felt faint and sweaty. Pt was put in chair. Pt vomited. BP was 91/56, pulse 75, . Pt was given zofran and scheduled toradol for pain. pt now in bed, states she is ok now.

## 2019-08-13 NOTE — PROGRESS NOTE ADULT - SUBJECTIVE AND OBJECTIVE BOX
ORTHO PROGRESS NOTE       59yFemale POD # 1     S/P left Total Knee Arthoplasty     Patient seen and examined at bedside . The patient is awake and alert in NAD. No complaints of chest pain, SOB, N/V.    Vital Signs Last 24 Hrs  T(C): 36.8 (13 Aug 2019 04:00), Max: 36.8 (13 Aug 2019 04:00)  T(F): 98.2 (13 Aug 2019 04:00), Max: 98.2 (13 Aug 2019 04:00)  HR: 72 (13 Aug 2019 04:00) (63 - 82)  BP: 129/61 (13 Aug 2019 04:00) (129/61 - 155/82)  BP(mean): --  RR: 18 (13 Aug 2019 04:00) (11 - 20)  SpO2: 96% (12 Aug 2019 20:45) (96% - 100%)                          11.6   11.27 )-----------( 175      ( 13 Aug 2019 05:29 )             35.0                 DVT ppx : lovenox     MEDICATIONS  (STANDING):  acetaminophen   Tablet .. 650 milliGRAM(s) Oral every 6 hours  atorvastatin 80 milliGRAM(s) Oral at bedtime  chlorhexidine 2% Cloths 1 Application(s) Topical daily  dextrose 5%. 1000 milliLiter(s) (50 mL/Hr) IV Continuous <Continuous>  dextrose 50% Injectable 12.5 Gram(s) IV Push once  dextrose 50% Injectable 25 Gram(s) IV Push once  dextrose 50% Injectable 25 Gram(s) IV Push once  docusate sodium 100 milliGRAM(s) Oral three times a day  enoxaparin Injectable 40 milliGRAM(s) SubCutaneous daily  ferrous    sulfate 325 milliGRAM(s) Oral three times a day with meals  gabapentin 100 milliGRAM(s) Oral every 8 hours  hydrochlorothiazide 12.5 milliGRAM(s) Oral daily  insulin lispro (HumaLOG) corrective regimen sliding scale   SubCutaneous three times a day before meals  ketorolac   Injectable 15 milliGRAM(s) IV Push every 6 hours  lisinopril 40 milliGRAM(s) Oral daily  metoprolol tartrate 100 milliGRAM(s) Oral two times a day  multivitamin 1 Tablet(s) Oral daily  pantoprazole    Tablet 40 milliGRAM(s) Oral before breakfast  polyethylene glycol 3350 17 Gram(s) Oral daily    MEDICATIONS  (PRN):  ALBUTerol    90 MICROgram(s) HFA Inhaler 2 Puff(s) Inhalation every 6 hours PRN Wheezing  aluminum hydroxide/magnesium hydroxide/simethicone Suspension 30 milliLiter(s) Oral four times a day PRN Indigestion  dextrose 40% Gel 15 Gram(s) Oral once PRN Blood Glucose LESS THAN 70 milliGRAM(s)/deciliter  glucagon  Injectable 1 milliGRAM(s) IntraMuscular once PRN Glucose LESS THAN 70 milligrams/deciliter  magnesium hydroxide Suspension 30 milliLiter(s) Oral daily PRN Constipation  ondansetron Injectable 4 milliGRAM(s) IV Push every 6 hours PRN Nausea and/or Vomiting  oxyCODONE    IR 5 milliGRAM(s) Oral every 4 hours PRN Severe Pain (7 - 10)  senna 2 Tablet(s) Oral at bedtime PRN Constipation  traMADol 50 milliGRAM(s) Oral every 4 hours PRN Moderate Pain (4 - 6)      PE:   left knee dressing C/D/I          Compartments soft         NVI, SILT           A/P: 59yFemale POD # 1   S/P   left Total Knee Arthoplasty             OOB to Chair            Physical Therapy           Pain control            Incentive Spirometry            DVT Prophylaxis            Discharge planning

## 2019-08-14 ENCOUNTER — TRANSCRIPTION ENCOUNTER (OUTPATIENT)
Age: 59
End: 2019-08-14

## 2019-08-14 VITALS
RESPIRATION RATE: 18 BRPM | SYSTOLIC BLOOD PRESSURE: 125 MMHG | DIASTOLIC BLOOD PRESSURE: 78 MMHG | TEMPERATURE: 98 F | HEART RATE: 80 BPM

## 2019-08-14 LAB
GLUCOSE BLDC GLUCOMTR-MCNC: 104 MG/DL — HIGH (ref 70–99)
GLUCOSE BLDC GLUCOMTR-MCNC: 122 MG/DL — HIGH (ref 70–99)
HCT VFR BLD CALC: 34.2 % — LOW (ref 37–47)
HGB BLD-MCNC: 11.1 G/DL — LOW (ref 12–16)
MCHC RBC-ENTMCNC: 29.2 PG — SIGNIFICANT CHANGE UP (ref 27–31)
MCHC RBC-ENTMCNC: 32.5 G/DL — SIGNIFICANT CHANGE UP (ref 32–37)
MCV RBC AUTO: 90 FL — SIGNIFICANT CHANGE UP (ref 81–99)
NRBC # BLD: 0 /100 WBCS — SIGNIFICANT CHANGE UP (ref 0–0)
PLATELET # BLD AUTO: 173 K/UL — SIGNIFICANT CHANGE UP (ref 130–400)
RBC # BLD: 3.8 M/UL — LOW (ref 4.2–5.4)
RBC # FLD: 12.8 % — SIGNIFICANT CHANGE UP (ref 11.5–14.5)
WBC # BLD: 11.95 K/UL — HIGH (ref 4.8–10.8)
WBC # FLD AUTO: 11.95 K/UL — HIGH (ref 4.8–10.8)

## 2019-08-14 RX ORDER — TRAMADOL HYDROCHLORIDE 50 MG/1
1 TABLET ORAL
Qty: 0 | Refills: 0 | DISCHARGE

## 2019-08-14 RX ORDER — ACETAMINOPHEN 500 MG
2 TABLET ORAL
Qty: 0 | Refills: 0 | DISCHARGE
Start: 2019-08-14

## 2019-08-14 RX ORDER — ASPIRIN/CALCIUM CARB/MAGNESIUM 324 MG
1 TABLET ORAL
Qty: 60 | Refills: 0
Start: 2019-08-14 | End: 2019-09-12

## 2019-08-14 RX ORDER — GABAPENTIN 400 MG/1
1 CAPSULE ORAL
Qty: 15 | Refills: 0
Start: 2019-08-14 | End: 2019-08-18

## 2019-08-14 RX ORDER — TRAMADOL HYDROCHLORIDE 50 MG/1
1 TABLET ORAL
Qty: 42 | Refills: 0
Start: 2019-08-14 | End: 2019-08-20

## 2019-08-14 RX ORDER — CELECOXIB 200 MG/1
1 CAPSULE ORAL
Qty: 14 | Refills: 0
Start: 2019-08-14 | End: 2019-08-27

## 2019-08-14 RX ORDER — ASPIRIN/CALCIUM CARB/MAGNESIUM 324 MG
1 TABLET ORAL
Qty: 0 | Refills: 0 | DISCHARGE

## 2019-08-14 RX ADMIN — POLYETHYLENE GLYCOL 3350 17 GRAM(S): 17 POWDER, FOR SOLUTION ORAL at 11:05

## 2019-08-14 RX ADMIN — OXYCODONE HYDROCHLORIDE 5 MILLIGRAM(S): 5 TABLET ORAL at 08:20

## 2019-08-14 RX ADMIN — Medication 1 TABLET(S): at 11:04

## 2019-08-14 RX ADMIN — LISINOPRIL 40 MILLIGRAM(S): 2.5 TABLET ORAL at 05:22

## 2019-08-14 RX ADMIN — CELECOXIB 200 MILLIGRAM(S): 200 CAPSULE ORAL at 11:04

## 2019-08-14 RX ADMIN — TRAMADOL HYDROCHLORIDE 50 MILLIGRAM(S): 50 TABLET ORAL at 11:03

## 2019-08-14 RX ADMIN — TRAMADOL HYDROCHLORIDE 50 MILLIGRAM(S): 50 TABLET ORAL at 11:33

## 2019-08-14 RX ADMIN — Medication 100 MILLIGRAM(S): at 05:23

## 2019-08-14 RX ADMIN — Medication 325 MILLIGRAM(S): at 08:18

## 2019-08-14 RX ADMIN — PANTOPRAZOLE SODIUM 40 MILLIGRAM(S): 20 TABLET, DELAYED RELEASE ORAL at 05:22

## 2019-08-14 RX ADMIN — ENOXAPARIN SODIUM 40 MILLIGRAM(S): 100 INJECTION SUBCUTANEOUS at 11:05

## 2019-08-14 RX ADMIN — CHLORHEXIDINE GLUCONATE 1 APPLICATION(S): 213 SOLUTION TOPICAL at 11:07

## 2019-08-14 RX ADMIN — Medication 650 MILLIGRAM(S): at 05:23

## 2019-08-14 RX ADMIN — Medication 100 MILLIGRAM(S): at 05:22

## 2019-08-14 RX ADMIN — Medication 12.5 MILLIGRAM(S): at 05:22

## 2019-08-14 RX ADMIN — GABAPENTIN 100 MILLIGRAM(S): 400 CAPSULE ORAL at 05:23

## 2019-08-14 RX ADMIN — OXYCODONE HYDROCHLORIDE 5 MILLIGRAM(S): 5 TABLET ORAL at 08:51

## 2019-08-14 RX ADMIN — Medication 650 MILLIGRAM(S): at 11:04

## 2019-08-14 NOTE — DISCHARGE NOTE PROVIDER - CARE PROVIDER_API CALL
Manolo Farr)  Orthopaedic Surgery  [address] [place] [address]  Phone: [phone]  Fax: [phone]  Follow Up Time:

## 2019-08-14 NOTE — DISCHARGE NOTE PROVIDER - NSDCFUADDINST_GEN_ALL_CORE_FT
keep surgical site clean and dry, may remove dressing i . Call Dr. Farr if any wound drainage, redness , increasing pain, fevers over 101 or if you have any questions or concerns.    You or visiting nurse to remove bandage. You may shower , do not scrub surgical site. Do not apply any lotions/moisturizers/creams to surgical site.     aspirin 81mg twice daily for one month, then resume your normal once daily dose.

## 2019-08-14 NOTE — DISCHARGE NOTE NURSING/CASE MANAGEMENT/SOCIAL WORK - NSDCDPATPORTLINK_GEN_ALL_CORE
You can access the [facility], offered by [facility], by registering with the following website: [email or web address]

## 2019-08-14 NOTE — PROGRESS NOTE ADULT - SUBJECTIVE AND OBJECTIVE BOX
ORTHO PROGRESS NOTE       59yFemale POD # 2     S/P left Total Knee Arthoplasty     Patient seen and examined at bedside . The patient is awake and alert in NAD. No complaints of chest pain, SOB, N/V.    Vital Signs Last 24 Hrs  T(C): 37.4 (14 Aug 2019 07:29), Max: 38.3 (13 Aug 2019 11:20)  T(F): 99.3 (14 Aug 2019 07:29), Max: 100.9 (13 Aug 2019 11:20)  HR: 77 (14 Aug 2019 07:29) (75 - 78)  BP: 121/58 (14 Aug 2019 07:29) (91/50 - 130/60)  BP(mean): --  RR: 18 (14 Aug 2019 07:29) (18 - 20)  SpO2: --                          11.1   11.95 )-----------( 173      ( 14 Aug 2019 05:20 )             34.2     08-13    135  |  100  |  12  ----------------------------<  118<H>  4.4   |  25  |  0.6<L>    Ca    8.6      13 Aug 2019 05:29            DVT ppx : lovenox    MEDICATIONS  (STANDING):  acetaminophen   Tablet .. 650 milliGRAM(s) Oral every 6 hours  atorvastatin 80 milliGRAM(s) Oral at bedtime  celecoxib 200 milliGRAM(s) Oral daily  chlorhexidine 2% Cloths 1 Application(s) Topical daily  dextrose 5%. 1000 milliLiter(s) (50 mL/Hr) IV Continuous <Continuous>  dextrose 50% Injectable 12.5 Gram(s) IV Push once  dextrose 50% Injectable 25 Gram(s) IV Push once  dextrose 50% Injectable 25 Gram(s) IV Push once  docusate sodium 100 milliGRAM(s) Oral three times a day  enoxaparin Injectable 40 milliGRAM(s) SubCutaneous daily  ferrous    sulfate 325 milliGRAM(s) Oral three times a day with meals  gabapentin 100 milliGRAM(s) Oral every 8 hours  hydrochlorothiazide 12.5 milliGRAM(s) Oral daily  insulin lispro (HumaLOG) corrective regimen sliding scale   SubCutaneous three times a day before meals  lisinopril 40 milliGRAM(s) Oral daily  metoprolol tartrate 100 milliGRAM(s) Oral two times a day  multivitamin 1 Tablet(s) Oral daily  pantoprazole    Tablet 40 milliGRAM(s) Oral before breakfast  polyethylene glycol 3350 17 Gram(s) Oral daily    MEDICATIONS  (PRN):  ALBUTerol    90 MICROgram(s) HFA Inhaler 2 Puff(s) Inhalation every 6 hours PRN Wheezing  aluminum hydroxide/magnesium hydroxide/simethicone Suspension 30 milliLiter(s) Oral four times a day PRN Indigestion  bisacodyl Suppository 10 milliGRAM(s) Rectal daily PRN If no bowel movement by postoperative day #2  dextrose 40% Gel 15 Gram(s) Oral once PRN Blood Glucose LESS THAN 70 milliGRAM(s)/deciliter  glucagon  Injectable 1 milliGRAM(s) IntraMuscular once PRN Glucose LESS THAN 70 milligrams/deciliter  magnesium hydroxide Suspension 30 milliLiter(s) Oral daily PRN Constipation  ondansetron Injectable 4 milliGRAM(s) IV Push every 6 hours PRN Nausea and/or Vomiting  oxyCODONE    IR 5 milliGRAM(s) Oral every 4 hours PRN Severe Pain (7 - 10)  senna 2 Tablet(s) Oral at bedtime PRN Constipation  traMADol 50 milliGRAM(s) Oral every 4 hours PRN Moderate Pain (4 - 6)      PE:   left knee dressing C/D/I          Compartments soft         NVI, SILT           A/P: 59yFemale POD # 2   S/P left Total Knee Arthoplasty            OOB to Chair            Physical Therapy           Pain control            Incentive Spirometry            DVT Prophylaxis            Discharge planning

## 2019-08-16 DIAGNOSIS — I10 ESSENTIAL (PRIMARY) HYPERTENSION: ICD-10-CM

## 2019-08-16 DIAGNOSIS — Z79.82 LONG TERM (CURRENT) USE OF ASPIRIN: ICD-10-CM

## 2019-08-16 DIAGNOSIS — M17.12 UNILATERAL PRIMARY OSTEOARTHRITIS, LEFT KNEE: ICD-10-CM

## 2019-08-16 DIAGNOSIS — Z79.84 LONG TERM (CURRENT) USE OF ORAL HYPOGLYCEMIC DRUGS: ICD-10-CM

## 2019-08-16 DIAGNOSIS — E11.9 TYPE 2 DIABETES MELLITUS WITHOUT COMPLICATIONS: ICD-10-CM

## 2019-08-16 LAB — SURGICAL PATHOLOGY STUDY: SIGNIFICANT CHANGE UP

## 2019-12-08 NOTE — ASU PREOP CHECKLIST - PATIENT SENT TO
endoscopy operating room none at this time spoke with patient's mother, Sandra ([phone]) and discussed with her that patient was transferred to medical unit and is doing   better today.  She reports she will visit patient today.

## 2021-02-03 NOTE — ED ADULT NURSE NOTE - NS ED NURSE DISCH DISPOSITION
Pt given urine cx result and provider recommendations. Pt states she feels better but is still having some slight back pain. Pt advised to complete abx, push fluids, take otc pain meds, and follow up with pcp prn. Pt verbalized understanding of all info.   Discharged

## 2021-04-22 NOTE — OCCUPATIONAL THERAPY INITIAL EVALUATION ADULT - PLANNED THERAPY INTERVENTIONS, OT EVAL
08-Apr-2021 19:54 transfer training/stretching/bed mobility training/ROM/strengthening/ADL retraining/balance training

## 2021-08-04 PROBLEM — J45.909 UNSPECIFIED ASTHMA, UNCOMPLICATED: Chronic | Status: ACTIVE | Noted: 2019-07-23

## 2021-08-04 PROBLEM — M54.9 DORSALGIA, UNSPECIFIED: Chronic | Status: ACTIVE | Noted: 2019-07-23

## 2021-08-04 PROBLEM — R01.1 CARDIAC MURMUR, UNSPECIFIED: Chronic | Status: ACTIVE | Noted: 2019-07-23

## 2021-08-04 PROBLEM — I10 ESSENTIAL (PRIMARY) HYPERTENSION: Chronic | Status: ACTIVE | Noted: 2019-07-23

## 2021-08-04 PROBLEM — Z87.19 PERSONAL HISTORY OF OTHER DISEASES OF THE DIGESTIVE SYSTEM: Chronic | Status: ACTIVE | Noted: 2019-07-23

## 2021-08-04 PROBLEM — E11.9 TYPE 2 DIABETES MELLITUS WITHOUT COMPLICATIONS: Chronic | Status: ACTIVE | Noted: 2019-07-23

## 2021-08-04 PROBLEM — M19.90 UNSPECIFIED OSTEOARTHRITIS, UNSPECIFIED SITE: Chronic | Status: ACTIVE | Noted: 2019-07-23

## 2021-08-05 PROBLEM — Z00.00 ENCOUNTER FOR PREVENTIVE HEALTH EXAMINATION: Status: ACTIVE | Noted: 2021-08-05

## 2021-10-06 NOTE — ASU PATIENT PROFILE, ADULT - NS PRO ABUSE SCREEN SUSPICION NEGLECT YN
Office visit, [facility], [facility]    Patient Care Team:  Blossom Farris MD as PCP - General (Internal Medicine)  Geremias Ramirez DO as Pulmonary Medicine (Pulmonary Medicine)  Cole Sibley MD as Gastroenterologist (Gastroenterology)  Rad Cobb MD as Ophthalmology (Ophthalmology)     Chief Complaint   Patient presents with   • Office Visit   • Numbness     feet/ DM shoes request   • Breathing Problem     History of present Illness:  Jennifer Ibrahim is a 76 year old female who  has a past medical history of Arthritis, Benign essential hypertension (7/24/2013), Dyspnea on exertion (12/18/2017), Gastroesophageal reflux disease, Hyperlipidemia (6/30/2006), Iron deficiency anemia due to chronic blood loss (7/5/2017), Malignant neoplasm (CMS/[facility]), PONV (postoperative nausea and vomiting), Shingles (05/2020), Shingles (herpes zoster) polyneuropathy (05/2020), Type 2 diabetes mellitus (CMS/[facility]) (6/30/2006), and Vitamin D deficiency (5/4/2019). She also has no past medical history of Difficult intubation, Failed moderate sedation during procedure, or Malignant hyperthermia.    Patient comes in today primarily to get a form filled out for his diabetic foot.  Patient also has history of diabetes.  Patient got her medication list but was not sure whether or not she is taking both medications.    Patient has history of shortness of breath on exertion and has had extensive work-up done in the past which has been on remarkable.    Review of systems:  Denies any fever, chills, chest pain, + shortness of breath etc.  Rest per HPI    ALLERGIES:   Allergen Reactions   • Penicillins RASH     Current Outpatient Medications   Medication Sig   • Biotin 10 MG Cap Take 2,500 mg by mouth.   • losartan (COZAAR) 25 MG tablet Take 0.5 tablets by mouth daily.   • sitaGLIPTIN-metFORMIN (Janumet)  MG per tablet Take 1 tablet by mouth 2 times daily (with meals).   • Nystop 446488 UNIT/GM powder APPLY  EXTERNALLY TO THE AFFECTED AREA THREE TIMES DAILY   • pioglitazone (ACTOS) 15 MG tablet Take 1 tablet by mouth daily.   • aspirin (ECOTRIN) 81 MG EC tablet Take 1 tablet by mouth daily. Do not start before December 17, 2020.   • gabapentin (NEURONTIN) 300 MG capsule Take 1 capsule by mouth 4 times daily. (Patient taking differently: Take 300 mg by mouth 2 times daily. )   • vitamin D, Cholecalciferol, 25 mcg (1,000 units) capsule Take 2 capsules by mouth daily.   • ferrous sulfate 325 (65 FE) MG tablet Take 1 tablet by mouth daily. (65mg of elemental iron)   • Calcium-Magnesium-Vitamin D (CALCIUM 1200+D3 PO)    • doxepin 3 MG tablet Take 1 tablet by mouth at bedtime.     No current facility-administered medications for this visit.     Past Medical History:   Diagnosis Date   • Arthritis    • Benign essential hypertension 7/24/2013   • Dyspnea on exertion 12/18/2017   • Gastroesophageal reflux disease    • Hyperlipidemia 6/30/2006   • Iron deficiency anemia due to chronic blood loss 7/5/2017   • Malignant neoplasm (CMS/HCC)     Left breast DCIS   • PONV (postoperative nausea and vomiting)    • Shingles 05/2020    under right breast   • Shingles (herpes zoster) polyneuropathy 05/2020   • Type 2 diabetes mellitus (CMS/HCC) 6/30/2006   • Vitamin D deficiency 5/4/2019     Past Surgical History:   Procedure Laterality Date   • Breast lumpectomy Bilateral     benign; no limb precautions per patient    • Breast lumpectomy Left 2020    DCIS   • Cholecystectomy        Family History   Problem Relation Age of Onset   • Diabetes Mother    • Diabetes Father    • Cancer Sister         breast     Social History     Socioeconomic History   • Marital status: /Civil Union     Spouse name: Not on file   • Number of children: Not on file   • Years of education: Not on file   • Highest education level: Not on file   Occupational History   • Occupation: retired   Tobacco Use   • Smoking status: Never Smoker   • Smokeless tobacco:  Never Used   Vaping Use   • Vaping Use: never used   Substance and Sexual Activity   • Alcohol use: Not Currently     Comment: occasional    • Drug use: Not Currently   • Sexual activity: Not on file   Other Topics Concern   • Not on file   Social History Narrative   • Not on file     Social Determinants of Health     Financial Resource Strain:    • Social Determinants: Financial Resource Strain: Not on file   Food Insecurity:    • Social Determinants: Food Insecurity: Not on file   Transportation Needs:    • Lack of Transportation (Medical): Not on file   • Lack of Transportation (Non-Medical): Not on file   Physical Activity:    • Days of Exercise per Week: Not on file   • Minutes of Exercise per Session: Not on file   Stress:    • Social Determinants: Stress: Not on file   Social Connections:    • Social Determinants: Social Connections: Not on file   Intimate Partner Violence: Not At Risk   • Social Determinants: Intimate Partner Violence Past Fear: No   • Social Determinants: Intimate Partner Violence Current Fear: No     Vitals:    10/06/21 1427   BP: 103/63   Pulse: 74   Temp: 97.1 °F (36.2 °C)   TempSrc: Tympanic   SpO2: 99%   Weight: 72.7 kg (160 lb 3.2 oz)   Height: 5' 3\" (1.6 m)   PainSc: 6    PainLoc: Arm     Body mass index is 28.38 kg/m².  Patient Active Problem List    Diagnosis Date Noted   • Annual physical exam 07/28/2020     Priority: Medium   • Ductal carcinoma in situ (DCIS) of left breast 11/23/2020     Priority: Low   • Abnormal mammogram 10/15/2020     Priority: Low   • SOB (shortness of breath) 07/28/2020     Priority: Low   • Educated about COVID-19 virus infection 05/30/2020     Priority: Low   • PHN (postherpetic neuralgia) 05/29/2020     Priority: Low   • Herpes zoster without complication 05/06/2020     Priority: Low   • Chronic midline low back pain without sciatica 10/14/2019     Priority: Low   • Need for influenza vaccination 10/14/2019     Priority: Low   • Chronic pain of multiple  joints 10/14/2019     Priority: Low   • Post-menopausal 05/04/2019     Priority: Low   • Vitamin D deficiency 05/04/2019     Priority: Low   • Dyspnea on exertion 12/18/2017     Priority: Low   • Major depressive disorder 10/19/2017     Priority: Low   • Iron deficiency anemia due to chronic blood loss 07/05/2017     Priority: Low   • Diabetic neuropathy (CMS/[facility]) 06/16/2015     Priority: Low   • Benign essential hypertension 07/24/2013     Priority: Low   • Type 2 diabetes mellitus (CMS/[facility]) 06/30/2006     Priority: Low   • Hyperlipidemia 06/30/2006     Priority: Low         Physical Examination:  Constitutional: Mildly overweight alert, in no acute distress and current vital signs reviewed.   Eyes: Normal  Pulmonary:   Clear to auscultation bilaterally.    Cardiovascular: RRR, no murmurs or rub appreciated, peripheral pulses normal and symmetrical bilaterally,  no edema noted   Abdomen: Bowel sounds normal active, nontender, nondistended.  No clinical organomegaly. No guarding, rebound or rigidity.  No umbilical hernia was discovered.  No abdominal masses  Neuro:  A & O X 3  Musculoskeletal: Normal   Skin:  Normal turgor with no rashes etc.     Assessment And Plan: Discussion/Summary:    1. Benign essential hypertension  .  Patient was to decrease her losartan as follows as her blood pressure is running low normal.  Discussed in detail that medications like losartan are given with diabetes for prevention of diabetic nephropathy rather than high blood pressure alone.  - losartan (COZAAR) 25 MG tablet; Take 0.5 tablets by mouth daily.  Dispense: 90 tablet; Refill: 0    2. Shortness of breath  .  Pulse ox checked at room air was 95% and on walking 50 feet in the clinic stated above 90%.  We will try doxepin as it could be related to anxiety.  - doxepin 3 MG tablet; Take 1 tablet by mouth at bedtime.  Dispense: 30 tablet; Refill: 0     3. Other diabetic neurological complication associated with other specified  diabetes mellitus (CMS/[facility])  .  Form for diabetic foot need to be filled out      Return if symptoms worsen or fail to improve, for follow up with PCP for ongoing care.     Nic Bailey MD        no

## 2021-10-25 ENCOUNTER — LABORATORY RESULT (OUTPATIENT)
Age: 61
End: 2021-10-25

## 2021-10-25 ENCOUNTER — APPOINTMENT (OUTPATIENT)
Dept: UROGYNECOLOGY | Facility: CLINIC | Age: 61
End: 2021-10-25
Payer: COMMERCIAL

## 2021-10-25 VITALS
HEIGHT: 64 IN | WEIGHT: 190 LBS | HEART RATE: 64 BPM | BODY MASS INDEX: 32.44 KG/M2 | SYSTOLIC BLOOD PRESSURE: 117 MMHG | DIASTOLIC BLOOD PRESSURE: 76 MMHG

## 2021-10-25 DIAGNOSIS — Z82.49 FAMILY HISTORY OF ISCHEMIC HEART DISEASE AND OTHER DISEASES OF THE CIRCULATORY SYSTEM: ICD-10-CM

## 2021-10-25 DIAGNOSIS — Z83.49 FAMILY HISTORY OF OTHER ENDOCRINE, NUTRITIONAL AND METABOLIC DISEASES: ICD-10-CM

## 2021-10-25 DIAGNOSIS — Z83.3 FAMILY HISTORY OF DIABETES MELLITUS: ICD-10-CM

## 2021-10-25 DIAGNOSIS — Z82.5 FAMILY HISTORY OF ASTHMA AND OTHER CHRONIC LOWER RESPIRATORY DISEASES: ICD-10-CM

## 2021-10-25 DIAGNOSIS — Z82.3 FAMILY HISTORY OF STROKE: ICD-10-CM

## 2021-10-25 DIAGNOSIS — M19.90 UNSPECIFIED OSTEOARTHRITIS, UNSPECIFIED SITE: ICD-10-CM

## 2021-10-25 DIAGNOSIS — E11.9 TYPE 2 DIABETES MELLITUS W/OUT COMPLICATIONS: ICD-10-CM

## 2021-10-25 DIAGNOSIS — M81.0 AGE-RELATED OSTEOPOROSIS W/OUT CURRENT PATHOLOGICAL FRACTURE: ICD-10-CM

## 2021-10-25 DIAGNOSIS — I10 ESSENTIAL (PRIMARY) HYPERTENSION: ICD-10-CM

## 2021-10-25 DIAGNOSIS — E78.00 PURE HYPERCHOLESTEROLEMIA, UNSPECIFIED: ICD-10-CM

## 2021-10-25 DIAGNOSIS — Z78.9 OTHER SPECIFIED HEALTH STATUS: ICD-10-CM

## 2021-10-25 DIAGNOSIS — Z83.42 FAMILY HISTORY OF FAMILIAL HYPERCHOLESTEROLEMIA: ICD-10-CM

## 2021-10-25 DIAGNOSIS — M06.9 RHEUMATOID ARTHRITIS, UNSPECIFIED: ICD-10-CM

## 2021-10-25 LAB
BILIRUB UR QL STRIP: NEGATIVE
CLARITY UR: CLEAR
COLLECTION METHOD: NORMAL
GLUCOSE UR-MCNC: NEGATIVE
HCG UR QL: 0.1 EU/DL
HGB UR QL STRIP.AUTO: NEGATIVE
KETONES UR-MCNC: NEGATIVE
LEUKOCYTE ESTERASE UR QL STRIP: NORMAL
NITRITE UR QL STRIP: POSITIVE
PH UR STRIP: 6
PROT UR STRIP-MCNC: NEGATIVE
SP GR UR STRIP: 1.02

## 2021-10-25 PROCEDURE — 99205 OFFICE O/P NEW HI 60 MIN: CPT | Mod: 25

## 2021-10-25 PROCEDURE — 51701 INSERT BLADDER CATHETER: CPT

## 2021-10-25 PROCEDURE — 81003 URINALYSIS AUTO W/O SCOPE: CPT | Mod: QW

## 2021-10-25 RX ORDER — ROSUVASTATIN CALCIUM 40 MG/1
40 TABLET, FILM COATED ORAL
Qty: 30 | Refills: 0 | Status: ACTIVE | COMMUNITY
Start: 2021-07-29

## 2021-10-25 RX ORDER — ISOSORBIDE MONONITRATE 30 MG/1
30 TABLET, EXTENDED RELEASE ORAL
Qty: 90 | Refills: 0 | Status: ACTIVE | COMMUNITY
Start: 2021-07-14

## 2021-10-25 RX ORDER — EZETIMIBE 10 MG/1
10 TABLET ORAL
Qty: 90 | Refills: 0 | Status: ACTIVE | COMMUNITY
Start: 2021-10-05

## 2021-10-25 RX ORDER — AMLODIPINE BESYLATE 5 MG/1
5 TABLET ORAL
Qty: 30 | Refills: 0 | Status: ACTIVE | COMMUNITY
Start: 2021-09-06

## 2021-10-25 RX ORDER — OMEGA-3-ACID ETHYL ESTERS CAPSULES 1 G/1
1 CAPSULE, LIQUID FILLED ORAL
Qty: 360 | Refills: 0 | Status: ACTIVE | COMMUNITY
Start: 2021-09-11

## 2021-10-25 RX ORDER — ASPIRIN 81 MG/1
81 TABLET, CHEWABLE ORAL
Qty: 90 | Refills: 0 | Status: ACTIVE | COMMUNITY
Start: 2021-07-12

## 2021-10-25 RX ORDER — METOPROLOL SUCCINATE 50 MG/1
50 TABLET, EXTENDED RELEASE ORAL
Qty: 180 | Refills: 0 | Status: ACTIVE | COMMUNITY
Start: 2021-10-04

## 2021-10-25 RX ORDER — FOLIC ACID 1 MG/1
1 TABLET ORAL
Qty: 30 | Refills: 0 | Status: ACTIVE | COMMUNITY
Start: 2021-05-29

## 2021-10-25 RX ORDER — HYDROCHLOROTHIAZIDE 12.5 MG/1
12.5 TABLET ORAL
Qty: 90 | Refills: 0 | Status: ACTIVE | COMMUNITY
Start: 2021-04-06

## 2021-10-25 RX ORDER — FLUTICASONE PROPIONATE 50 UG/1
50 SPRAY, METERED NASAL
Qty: 16 | Refills: 0 | Status: ACTIVE | COMMUNITY
Start: 2021-01-27

## 2021-10-25 RX ORDER — CHLORZOXAZONE 500 MG/1
500 TABLET ORAL
Qty: 30 | Refills: 0 | Status: ACTIVE | COMMUNITY
Start: 2021-02-08

## 2021-10-25 RX ORDER — LISINOPRIL 40 MG/1
40 TABLET ORAL
Qty: 180 | Refills: 0 | Status: ACTIVE | COMMUNITY
Start: 2021-03-03

## 2021-10-25 RX ORDER — BLOOD SUGAR DIAGNOSTIC
STRIP MISCELLANEOUS
Qty: 200 | Refills: 0 | Status: ACTIVE | COMMUNITY
Start: 2021-07-04

## 2021-10-25 RX ORDER — MONTELUKAST SODIUM 10 MG/1
10 TABLET, FILM COATED ORAL
Refills: 0 | Status: ACTIVE | COMMUNITY

## 2021-10-25 NOTE — COUNSELING
[FreeTextEntry1] : \par We will notify you of the urine results\par \par If there is an infection, we will check to see if you empty better without an infection\par \par If there is no infection, then we will check to see if you empty better after 2-3 weeks of working on constipation control\par \par Please call the office if you feel like you have an infection so that we can arrange testing of the urine. If you keep getting infections then I will recommend further evaluation of your kidneys and bladder\par \par Bowel recipe every morning for constipation control\par \par Apply a pea size amount of the cream to the opening of the vagina every night for two weeks followed by three nights per week\par \par Please call my office if you have any issues with the cost or side effects of the medication.\par \par Follow up will be scheduled based on the urine results from today\par

## 2021-10-25 NOTE — PHYSICAL EXAM
[Chaperone Present] : A chaperone was present in the examining room during all aspects of the physical examination [FreeTextEntry1] : Void: 20cc\par PVR: 290cc\par Urethra was prepped in sterile fashion and then a sterile catheter was used by me to drain the bladder.\par  \par Well healed incision: laparoscopic\par normal perineal sensation\par normal perineal reflexes\par negative cough stress test\par + atrophy\par no prolapse\par + urethral hypermobility\par bilateral levator ani spasm, no tenderness\par no urethral tenderness\par no bladder tenderness\par no cervical tenderness\par no uterine tenderness\par significant hard stools in the rectum\par 1/5 Kegel\par

## 2021-10-25 NOTE — HISTORY OF PRESENT ILLNESS
[FreeTextEntry1] : \par Pt with pelvic floor dysfunction here for urogynecologic evaluation. She describes: \par Referring provider: Dr Ling Hawk\par \par Chief PFD: UTIs, urinary incontinence\par \par History of rheumatoid arthritis, on MTX\par \par UTIs: reports 3-4 UTIs in the last year, most recent UTI 7/2021, not associated with intercourse\par Symptoms: dysuria, bladder pain, no hematuria\par \par Records reviewed:\par 7/23/21: e coli R amp R bactrim\par 5/7/21: UCX: GBS (clean catch)\par \par Pelvic organ prolapse:  s/p tubal, no bulge, denies splinting\par Stress urinary incontinence: with coughing\par Overactive bladder syndrome: hx of DM (a1c 5.4%), h/o spinal surgery x2 in the Spring (complicated by a spinal fluid leak), +urge incontinence daily since the back surgeries, no glaucoma\par Voiding dysfunction: + incomplete bladder emptying, no hesitancy \par Lower urinary tract/vaginal symptoms: as above UTIs per year, no hematuria, no dysuria, no bladder pain \par Fecal incontinence: denies\par Defecatory dysfunction: Type I\par Sexual dysfunction: denies issues\par Pelvic pain: on flexeril, on gabapentin, on elavil, denies pelvic pain\par Vaginal dryness: yes\par \par Her pelvic floor symptoms are significantly bothersome and negatively impacting her quality of life. \par \par

## 2021-10-25 NOTE — DISCUSSION/SUMMARY
[FreeTextEntry1] : \par History of UTI-\par Advised the patient that recurrent UTIs are defined as having 3 or more positive urine culture in 1 year or 2 or more in 6 months, which she has not had. Advised to call the office if she feels like she has an infection so that we can arrange testing of her urine. If she keeps getting infections then I will recommend a workup of further evaluation of her kidneys and bladder and further prevention treatment including estrogen vaginal cream and daily antibiotic suppression. The patient voiced understanding and agrees with the plan.\par \par Atrophic vaginitis-\par We reviewed the risks, benefits, alternatives and indications of local estrogen therapy and I gave her a handout that covers this information. She does opt to begin this therapy. I have given her a prescription and specific instructions on how to use the estrogen cream, applied with a finger at a low dose for urogenital atrophy.\par \par Overflow incontinence-\par Advised that the urinary incontinence can be secondary to incomplete bladder emptying. We will discuss further management options for urinary incontinence If the patient continue to have incontinence after resolvement of incomplete bladder emptying.\par \par Incomplete bladder emptying-\par Advised the patient that this can be due to an acute UTI or constipation or other etiologies. Will send her urine to rule out infectious etiology and will have the patient work on constipation control. If she still does not empty well, then I will recommend further workup with urodynamics (no reduction). Discussed with the patient that we are concerned about incomplete bladder emptying because it can cause recurrent UTI and can cause kidney damage. The patient voiced understanding.\par \par Constipation-\par I disimpacted a significant amount of hard stool. Patient reported feeling relieve after the disimpaction.\par \par The patient was counseled about her defecatory dysfunction. We discussed the importance of fiber, hydration and exercise. She was given a handout with specific information about dietary fiber and ways to relieve constipation.\par \par \par

## 2021-10-26 LAB
APPEARANCE: ABNORMAL
BILIRUBIN URINE: NEGATIVE
BLOOD URINE: NEGATIVE
COLOR: YELLOW
GLUCOSE QUALITATIVE U: NEGATIVE
KETONES URINE: NEGATIVE
LEUKOCYTE ESTERASE URINE: ABNORMAL
NITRITE URINE: POSITIVE
PH URINE: 6
PROTEIN URINE: NORMAL
SPECIFIC GRAVITY URINE: 1.02
UROBILINOGEN URINE: NORMAL

## 2021-11-01 ENCOUNTER — NON-APPOINTMENT (OUTPATIENT)
Age: 61
End: 2021-11-01

## 2021-11-01 DIAGNOSIS — Z87.440 PERSONAL HISTORY OF URINARY (TRACT) INFECTIONS: ICD-10-CM

## 2021-11-01 LAB — BACTERIA UR CULT: ABNORMAL

## 2021-11-18 ENCOUNTER — APPOINTMENT (OUTPATIENT)
Dept: UROGYNECOLOGY | Facility: CLINIC | Age: 61
End: 2021-11-18
Payer: COMMERCIAL

## 2021-11-18 VITALS
HEIGHT: 64 IN | BODY MASS INDEX: 32.44 KG/M2 | SYSTOLIC BLOOD PRESSURE: 127 MMHG | HEART RATE: 70 BPM | DIASTOLIC BLOOD PRESSURE: 79 MMHG | WEIGHT: 190 LBS

## 2021-11-18 PROCEDURE — 51701 INSERT BLADDER CATHETER: CPT

## 2021-11-18 PROCEDURE — 99214 OFFICE O/P EST MOD 30 MIN: CPT | Mod: 25

## 2021-11-18 NOTE — COUNSELING
[FreeTextEntry1] : Schedule BMP (blood test to evaluate the kidney function). This should be done prior to the CT \par \par Please schedule the CT to further evaluate the kidneys. This should be done prior to the cysto visit \par \par Please schedule a cysto with Dr Pérez  in 5 weeks (NICOLASA). Please come with a full bladder (not painful)\par \par Please start Macrobid 100mg once a day for 3 months to help prevent infections.\par \par Please restart to use estrogen cream a pea size amount every night for 2 weeks, then 3 times a week (Monday, Wednesday and Friday)\par \par Schedule bladder function testing (UDS without reduction) with my PA Sirisha. \par \par Please call the office if you feel like you have an infection because we cannot do the bladder function testing in the setting of an infection.\par \par Please come with a full, not painful bladder.\par

## 2021-11-18 NOTE — HISTORY OF PRESENT ILLNESS
[FreeTextEntry1] : Patient is here for JACINTO/PVR check. \par Last seen on 10/25/21 as a new pt with UTIs, urinary incontinence. \par \par History of rheumatoid arthritis, on MTX\par \par Records reviewed:\par 7/23/21: e coli R amp R bactrim\par 5/7/21: UCX: GBS (clean catch)\par \par s/p tubal\par hx of DM (a1c 5.4%), h/o spinal surgery x2 in the Spring (complicated by a spinal fluid leak)\par no glaucoma\par \par on flexeril, on gabapentin, on elavil\par \par 10/25/21, NP visit: + Incomplete bladder emptying, PVR: 290cc\par no prolapse\par \par Bowel recipe for constiipation\par Estrace cream\par \par +UTI, 10/25/21, >100K E Coli, Resistant to Ampicillin, Amp/Sulbactam, Bactrim. Treated with Macrobid 100mg BID x 7 days\par \par TOday pt states that she is felling better. But still feels that she does not empty her bladder well, has to push on the bladder and bend to help empty it. She was using the estrogen cream but went away to [place] and stopped using the cream. Used bowel recipe, helped only for 1-2 days. uses OTC stool softeners with minimal help. \par

## 2021-11-18 NOTE — PHYSICAL EXAM
[Chaperone Present] : A chaperone was present in the examining room during all aspects of the physical examination [No Acute Distress] : in no acute distress [Well developed] : well developed [Well Nourished] : ~L well nourished [FreeTextEntry1] : Urethra was prepped in sterile fashion and then a sterile catheter was used by me to drain the bladder.\par Void: 50cc\par Cath: 205cx

## 2021-11-18 NOTE — DISCUSSION/SUMMARY
[FreeTextEntry1] : Recurrent UTIs\par Work up explained to the pt and ordered, BMP, CT abd/pelvis with and w/o contrast and cystoscopy \par Start Macrobid 100mg QD x 3 months for suppression\par \par Atrophic Vaginitis\par Advised to restart estrace cream QHS x 2 weeks, then 3x a week\par \par Overflow Incontinence\par Most likely due to not emptying her bladder fully\par \par Incomplete Bladder Emptying\par Schedule UDS w/o reduction to evaluate\par \par Constipation\par Advised to try Miralax and MOM

## 2021-11-21 LAB — BACTERIA UR CULT: NORMAL

## 2021-11-30 ENCOUNTER — NON-APPOINTMENT (OUTPATIENT)
Age: 61
End: 2021-11-30

## 2021-12-03 ENCOUNTER — NON-APPOINTMENT (OUTPATIENT)
Age: 61
End: 2021-12-03

## 2021-12-09 ENCOUNTER — NON-APPOINTMENT (OUTPATIENT)
Age: 61
End: 2021-12-09

## 2021-12-09 ENCOUNTER — APPOINTMENT (OUTPATIENT)
Dept: OPHTHALMOLOGY | Facility: CLINIC | Age: 61
End: 2021-12-09
Payer: COMMERCIAL

## 2021-12-09 PROCEDURE — 92134 CPTRZ OPH DX IMG PST SGM RTA: CPT

## 2021-12-09 PROCEDURE — 92020 GONIOSCOPY: CPT

## 2021-12-09 PROCEDURE — 92004 COMPRE OPH EXAM NEW PT 1/>: CPT

## 2021-12-09 PROCEDURE — 92136 OPHTHALMIC BIOMETRY: CPT

## 2021-12-11 ENCOUNTER — TRANSCRIPTION ENCOUNTER (OUTPATIENT)
Age: 61
End: 2021-12-11

## 2021-12-20 ENCOUNTER — NON-APPOINTMENT (OUTPATIENT)
Age: 61
End: 2021-12-20

## 2021-12-20 ENCOUNTER — TRANSCRIPTION ENCOUNTER (OUTPATIENT)
Age: 61
End: 2021-12-20

## 2021-12-21 ENCOUNTER — APPOINTMENT (OUTPATIENT)
Dept: OPHTHALMOLOGY | Facility: AMBULATORY SURGERY CENTER | Age: 61
End: 2021-12-21

## 2021-12-21 ENCOUNTER — NON-APPOINTMENT (OUTPATIENT)
Age: 61
End: 2021-12-21

## 2021-12-21 ENCOUNTER — OUTPATIENT (OUTPATIENT)
Dept: OUTPATIENT SERVICES | Facility: HOSPITAL | Age: 61
LOS: 1 days | Discharge: HOME | End: 2021-12-21
Payer: COMMERCIAL

## 2021-12-21 DIAGNOSIS — Z98.890 OTHER SPECIFIED POSTPROCEDURAL STATES: Chronic | ICD-10-CM

## 2021-12-21 DIAGNOSIS — J34.2 DEVIATED NASAL SEPTUM: Chronic | ICD-10-CM

## 2021-12-21 DIAGNOSIS — Z90.89 ACQUIRED ABSENCE OF OTHER ORGANS: Chronic | ICD-10-CM

## 2021-12-21 DIAGNOSIS — Z98.51 TUBAL LIGATION STATUS: Chronic | ICD-10-CM

## 2021-12-21 LAB — GLUCOSE BLDC GLUCOMTR-MCNC: 87 MG/DL — SIGNIFICANT CHANGE UP (ref 70–99)

## 2021-12-21 PROCEDURE — 66984 XCAPSL CTRC RMVL W/O ECP: CPT | Mod: LT

## 2021-12-22 ENCOUNTER — NON-APPOINTMENT (OUTPATIENT)
Age: 61
End: 2021-12-22

## 2021-12-22 ENCOUNTER — APPOINTMENT (OUTPATIENT)
Dept: OPHTHALMOLOGY | Facility: CLINIC | Age: 61
End: 2021-12-22
Payer: COMMERCIAL

## 2021-12-22 PROCEDURE — 99024 POSTOP FOLLOW-UP VISIT: CPT

## 2021-12-27 ENCOUNTER — APPOINTMENT (OUTPATIENT)
Dept: UROGYNECOLOGY | Facility: CLINIC | Age: 61
End: 2021-12-27
Payer: COMMERCIAL

## 2021-12-27 VITALS
DIASTOLIC BLOOD PRESSURE: 69 MMHG | HEIGHT: 64 IN | HEART RATE: 65 BPM | SYSTOLIC BLOOD PRESSURE: 108 MMHG | WEIGHT: 190 LBS | BODY MASS INDEX: 32.44 KG/M2

## 2021-12-27 PROCEDURE — 51728 CYSTOMETROGRAM W/VP: CPT

## 2021-12-27 PROCEDURE — 51797 INTRAABDOMINAL PRESSURE TEST: CPT

## 2021-12-27 PROCEDURE — 51784 ANAL/URINARY MUSCLE STUDY: CPT

## 2021-12-27 PROCEDURE — 51741 ELECTRO-UROFLOWMETRY FIRST: CPT

## 2021-12-27 PROCEDURE — 81003 URINALYSIS AUTO W/O SCOPE: CPT | Mod: QW

## 2021-12-28 LAB
BILIRUB UR QL STRIP: NEGATIVE
CLARITY UR: CLEAR
COLLECTION METHOD: NORMAL
GLUCOSE UR-MCNC: NEGATIVE
HCG UR QL: 0.2 EU/DL
HGB UR QL STRIP.AUTO: NEGATIVE
KETONES UR-MCNC: NEGATIVE
LEUKOCYTE ESTERASE UR QL STRIP: NEGATIVE
NITRITE UR QL STRIP: NEGATIVE
PH UR STRIP: 6
PROT UR STRIP-MCNC: NEGATIVE
SP GR UR STRIP: 1.02

## 2022-01-03 ENCOUNTER — TRANSCRIPTION ENCOUNTER (OUTPATIENT)
Age: 62
End: 2022-01-03

## 2022-01-04 ENCOUNTER — NON-APPOINTMENT (OUTPATIENT)
Age: 62
End: 2022-01-04

## 2022-01-04 ENCOUNTER — OUTPATIENT (OUTPATIENT)
Dept: OUTPATIENT SERVICES | Facility: HOSPITAL | Age: 62
LOS: 1 days | Discharge: HOME | End: 2022-01-04
Payer: COMMERCIAL

## 2022-01-04 ENCOUNTER — APPOINTMENT (OUTPATIENT)
Dept: OPHTHALMOLOGY | Facility: AMBULATORY SURGERY CENTER | Age: 62
End: 2022-01-04

## 2022-01-04 DIAGNOSIS — Z98.890 OTHER SPECIFIED POSTPROCEDURAL STATES: Chronic | ICD-10-CM

## 2022-01-04 DIAGNOSIS — Z90.89 ACQUIRED ABSENCE OF OTHER ORGANS: Chronic | ICD-10-CM

## 2022-01-04 DIAGNOSIS — Z98.51 TUBAL LIGATION STATUS: Chronic | ICD-10-CM

## 2022-01-04 DIAGNOSIS — J34.2 DEVIATED NASAL SEPTUM: Chronic | ICD-10-CM

## 2022-01-04 LAB — GLUCOSE BLDC GLUCOMTR-MCNC: 103 MG/DL — HIGH (ref 70–99)

## 2022-01-04 PROCEDURE — 66984 XCAPSL CTRC RMVL W/O ECP: CPT | Mod: 79,RT

## 2022-01-04 DEVICE — LENS IOL TECNIS PROTEC ZCB00 19.5D
Type: IMPLANTABLE DEVICE | Site: RIGHT | Status: NON-FUNCTIONAL
Removed: 2022-01-04

## 2022-01-05 ENCOUNTER — APPOINTMENT (OUTPATIENT)
Dept: OPHTHALMOLOGY | Facility: CLINIC | Age: 62
End: 2022-01-05
Payer: COMMERCIAL

## 2022-01-05 ENCOUNTER — NON-APPOINTMENT (OUTPATIENT)
Age: 62
End: 2022-01-05

## 2022-01-05 PROCEDURE — 99024 POSTOP FOLLOW-UP VISIT: CPT

## 2022-01-13 ENCOUNTER — NON-APPOINTMENT (OUTPATIENT)
Age: 62
End: 2022-01-13

## 2022-01-13 ENCOUNTER — APPOINTMENT (OUTPATIENT)
Dept: OPHTHALMOLOGY | Facility: CLINIC | Age: 62
End: 2022-01-13
Payer: COMMERCIAL

## 2022-01-13 PROCEDURE — 99024 POSTOP FOLLOW-UP VISIT: CPT

## 2022-01-21 ENCOUNTER — APPOINTMENT (OUTPATIENT)
Dept: UROGYNECOLOGY | Facility: CLINIC | Age: 62
End: 2022-01-21
Payer: COMMERCIAL

## 2022-01-21 VITALS
HEIGHT: 64 IN | WEIGHT: 190 LBS | SYSTOLIC BLOOD PRESSURE: 118 MMHG | BODY MASS INDEX: 32.44 KG/M2 | HEART RATE: 64 BPM | DIASTOLIC BLOOD PRESSURE: 77 MMHG

## 2022-01-21 DIAGNOSIS — R18.8 OTHER ASCITES: ICD-10-CM

## 2022-01-21 LAB
BILIRUB UR QL STRIP: NEGATIVE
CLARITY UR: CLEAR
COLLECTION METHOD: NORMAL
GLUCOSE UR-MCNC: NEGATIVE
HCG UR QL: 0.2 EU/DL
HGB UR QL STRIP.AUTO: NEGATIVE
KETONES UR-MCNC: NEGATIVE
LEUKOCYTE ESTERASE UR QL STRIP: NEGATIVE
NITRITE UR QL STRIP: NEGATIVE
PH UR STRIP: 6
PROT UR STRIP-MCNC: NEGATIVE
SP GR UR STRIP: 1.03

## 2022-01-21 PROCEDURE — 81003 URINALYSIS AUTO W/O SCOPE: CPT | Mod: QW

## 2022-01-21 PROCEDURE — 52000 CYSTOURETHROSCOPY: CPT

## 2022-01-21 PROCEDURE — 99214 OFFICE O/P EST MOD 30 MIN: CPT | Mod: 25

## 2022-01-21 RX ORDER — NITROFURANTOIN (MONOHYDRATE/MACROCRYSTALS) 25; 75 MG/1; MG/1
100 CAPSULE ORAL TWICE DAILY
Qty: 14 | Refills: 0 | Status: DISCONTINUED | COMMUNITY
Start: 2021-11-01 | End: 2022-01-21

## 2022-01-27 ENCOUNTER — NON-APPOINTMENT (OUTPATIENT)
Age: 62
End: 2022-01-27

## 2022-01-27 ENCOUNTER — APPOINTMENT (OUTPATIENT)
Dept: OPHTHALMOLOGY | Facility: CLINIC | Age: 62
End: 2022-01-27
Payer: COMMERCIAL

## 2022-01-27 PROCEDURE — 99024 POSTOP FOLLOW-UP VISIT: CPT

## 2022-02-17 ENCOUNTER — APPOINTMENT (OUTPATIENT)
Dept: UROGYNECOLOGY | Facility: CLINIC | Age: 62
End: 2022-02-17

## 2022-03-03 ENCOUNTER — NON-APPOINTMENT (OUTPATIENT)
Age: 62
End: 2022-03-03

## 2022-03-03 ENCOUNTER — APPOINTMENT (OUTPATIENT)
Dept: OPHTHALMOLOGY | Facility: CLINIC | Age: 62
End: 2022-03-03
Payer: COMMERCIAL

## 2022-03-03 PROCEDURE — 99024 POSTOP FOLLOW-UP VISIT: CPT

## 2022-03-14 ENCOUNTER — RX RENEWAL (OUTPATIENT)
Age: 62
End: 2022-03-14

## 2022-03-22 ENCOUNTER — APPOINTMENT (OUTPATIENT)
Dept: UROGYNECOLOGY | Facility: CLINIC | Age: 62
End: 2022-03-22
Payer: COMMERCIAL

## 2022-03-22 VITALS
HEART RATE: 70 BPM | WEIGHT: 199 LBS | SYSTOLIC BLOOD PRESSURE: 132 MMHG | DIASTOLIC BLOOD PRESSURE: 84 MMHG | HEIGHT: 64.5 IN | BODY MASS INDEX: 33.56 KG/M2

## 2022-03-22 PROCEDURE — 99214 OFFICE O/P EST MOD 30 MIN: CPT | Mod: 25

## 2022-03-22 PROCEDURE — 51701 INSERT BLADDER CATHETER: CPT

## 2022-03-22 NOTE — PHYSICAL EXAM
[Chaperone Present] : A chaperone was present in the examining room during all aspects of the physical examination [No Acute Distress] : in no acute distress [Well developed] : well developed [Well Nourished] : ~L well nourished [FreeTextEntry1] : Urethra was prepped in sterile fashion and then a sterile catheter (14F) was used by me to drain the bladder. The patient tolerated the procedure well.\par Void: 200 cc\par PVR: 300 cc

## 2022-03-22 NOTE — COUNSELING
[FreeTextEntry1] : If you feel like you have an infection it is important for you to call our office and we will arrange testing of your urine.\par \par Please continue taking Macrobid 100 mg daily.\par \par Please continue taking Flexeril 5 mg twice a day. \par \par Please begin taking Flomax 0.4 mg at bedtime. Please  your refill when you complete the 1st bottle.\par \par Please continue to apply a pea size amount to the opening of the vagina three times a week. \par \par Please call my office if you have any issues with the cost or side effects of the medication. \par \par Schedule a 6 week follow up med check/PVR check appointment with MOHSEN Grimm.\par \par

## 2022-03-22 NOTE — HISTORY OF PRESENT ILLNESS
[FreeTextEntry1] : Patient is here for 8 weeks med check for urge incontinence.\par Last seen on 1/21/2022 for cysto (normal) \par \par 11/29/21: BMP normal\par 12/1/21: CT: kidneys normal (some fluid by ovary, recommended pelvic sono 12/13/21: normal)\par Using estrace cream three times per week\par Using daily macrobid 100mg daily since 11/18/21\par \par History of incomplete bladder emptying and overflow incontinence\par No prolapse on exam\par Significant constipation- was using miralax and now using prunes also. Has a NP visit with GI this upcoming Monday\par  hx of DM (a1c 5.4%)\par h/o spinal surgery x2 in the Spring (complicated by a spinal fluid leak), + incontinence since the surgeries\par \par Underwent urodynamics 12/27/21:\par Impression: incomplete bladder emptying that is likely secondary to underactive bladder, pelvic muscle hypertonicity, urethral hypertonicity\par Plan: renal imaging, muscle relaxant +/- flomax, if no improvement then recommend intermittent self cathing \par \par Flexeril 5mg BID\par \par Today, patient states she is happy with estrogen cream, Macrobid 100 mg daily and Flexeril 5 mg BID and is noticing improvement. Denies side effects. Patient does not feel she has an infection. She is complaining of leg swelling that is bothersome. She is also complaining of getting up multiple times at night to urinate. \par \par Patient would like to continue estrogen cream, Macrobid 100 mg, and Flexeril 5 mg BID.\par

## 2022-03-22 NOTE — DISCUSSION/SUMMARY
[FreeTextEntry1] : \par Overflow incontinence-\par Patient happy with Flexeril 5 mg BID.\par Precautions reviewed.\par Will add Flomax and check PVR in 6 weeks to see if she empties better. If she still does not empty better, will increase the dose of Flexeril. \par Will return in 6 weeks for med check/PVR check or earlier if she has any issues.\par \par Atrophic Vaginitis:\par Advised to continue to apply a pea size amount of the cream to the opening of the vagina three nights per week.\par \par Recurrent UTI\par continue Macrobid 100 mg daily. \par \par Advised patient to see her primary care doctor regarding the recent leg swelling

## 2022-03-24 ENCOUNTER — APPOINTMENT (OUTPATIENT)
Dept: UROGYNECOLOGY | Facility: CLINIC | Age: 62
End: 2022-03-24

## 2022-05-02 ENCOUNTER — APPOINTMENT (OUTPATIENT)
Dept: UROGYNECOLOGY | Facility: CLINIC | Age: 62
End: 2022-05-02
Payer: COMMERCIAL

## 2022-05-02 VITALS — HEART RATE: 73 BPM | SYSTOLIC BLOOD PRESSURE: 123 MMHG | DIASTOLIC BLOOD PRESSURE: 82 MMHG

## 2022-05-02 VITALS
HEIGHT: 64.5 IN | WEIGHT: 199 LBS | BODY MASS INDEX: 33.56 KG/M2 | HEART RATE: 80 BPM | DIASTOLIC BLOOD PRESSURE: 85 MMHG | SYSTOLIC BLOOD PRESSURE: 137 MMHG

## 2022-05-02 PROCEDURE — 99214 OFFICE O/P EST MOD 30 MIN: CPT | Mod: 25

## 2022-05-02 PROCEDURE — 51701 INSERT BLADDER CATHETER: CPT

## 2022-05-02 NOTE — HISTORY OF PRESENT ILLNESS
[FreeTextEntry1] : Patient is here for 6 weeks med check for urge incontinence.\par Last seen on 3/22/22 for med check.\par \par 11/29/21: BMP normal\par 12/1/21: CT: kidneys normal (some fluid by ovary, recommended pelvic sono 12/13/21: normal)\par Using estrace cream three times per week\par Using daily macrobid 100mg daily since 11/18/21\par \par History of incomplete bladder emptying and overflow incontinence\par No prolapse on exam\par Significant constipation- was using miralax and now using prunes also. \par Hx of DM (a1c 5.4%)\par h/o spinal surgery x2 in the Spring (complicated by a spinal fluid leak), + incontinence since the surgeries\par \par Underwent urodynamics 12/27/21:\par Impression: incomplete bladder emptying that is likely secondary to underactive bladder, pelvic muscle hypertonicity, urethral hypertonicity\par Plan: renal imaging, muscle relaxant +/- flomax, if no improvement then recommend intermittent self cathing \par \par Flexeril 5mg BID\par Flomax 0.4mg QHS\par \par S/p Colonoscopy\par \par Today, patient states she is happy with adding Flomax 0.4 mg QHS and is noticing improvement. Feels that she's urinating better. Denies side effects. Patient does not feel she has an infection. Takes Flexeril 5mg BID and Macrobid 100mg QD. \par \par

## 2022-05-02 NOTE — DISCUSSION/SUMMARY
[FreeTextEntry1] : Overflow incontinence-\par PVR: 120cc (elevated)\par Increase Rx Flexeril 10mg BID (1R)\par Cont Flomax 0.4mg QHS\par Urine culture obtained.\par Will follow up.\par Will treat accordingly if necessary\par Precautions reviewed.\par Will return in 6 weeks for med check/PVR check or earlier if she has any issues.\par \par Atrophic Vaginitis:\par Advised to continue to apply a pea size amount of the cream to the opening of the vagina three nights per week.\par \par Recurrent UTI\par continue Macrobid 100 mg daily. \par

## 2022-05-02 NOTE — PHYSICAL EXAM
[Chaperone Present] : A chaperone was present in the examining room during all aspects of the physical examination [FreeTextEntry1] : Urethra was prepped in sterile fashion and then a sterile catheter (14F) was used by me to drain the bladder. The patient tolerated the procedure well.\par \par Void: 0\par PVR: 120cc [No Acute Distress] : in no acute distress [Well developed] : well developed [Well Nourished] : ~L well nourished

## 2022-05-02 NOTE — COUNSELING
[FreeTextEntry1] : If you feel like you have an infection it is important for you to call our office and we will arrange testing of your urine.\par \par Please continue taking Flomax 0.4mg and Macrobid 100mg for frequency. \par \par Please STOP taking Flexeril 5 mg.\par \par Please begin taking Flexeril 10 mg twice a day. It takes up to 6 weeks to go into full effect. Please  your refill when you complete the 1st bottle.\par \par Please call my office if you have any issues with the cost or side effects of the medication. \par \par Schedule a 6 weeks follow up med check appointment and PVR check. \par

## 2022-05-03 ENCOUNTER — RX RENEWAL (OUTPATIENT)
Age: 62
End: 2022-05-03

## 2022-05-04 LAB — BACTERIA UR CULT: NORMAL

## 2022-05-19 ENCOUNTER — NON-APPOINTMENT (OUTPATIENT)
Age: 62
End: 2022-05-19

## 2022-05-26 ENCOUNTER — APPOINTMENT (OUTPATIENT)
Age: 62
End: 2022-05-26
Payer: COMMERCIAL

## 2022-05-26 VITALS
WEIGHT: 197 LBS | OXYGEN SATURATION: 98 % | SYSTOLIC BLOOD PRESSURE: 120 MMHG | RESPIRATION RATE: 12 BRPM | DIASTOLIC BLOOD PRESSURE: 70 MMHG | HEIGHT: 60 IN | BODY MASS INDEX: 38.68 KG/M2 | HEART RATE: 79 BPM

## 2022-05-26 PROCEDURE — 99214 OFFICE O/P EST MOD 30 MIN: CPT | Mod: 25

## 2022-05-26 PROCEDURE — 71046 X-RAY EXAM CHEST 2 VIEWS: CPT

## 2022-05-26 NOTE — HISTORY OF PRESENT ILLNESS
[Cough Variant] : cough variant [Doing Well] : doing well [Well Controlled] : Well controlled [Cold] : cold weather [Adherent] : the patient is adherent with ~his/her~ medication regimen [Goals--Doing Well] : the patient is doing well with ~his/her~ asthma goals [TextBox_4] : 63 yo F requests sleep study for worsening morning headaches since May 2021 [More Frequent Use Needed Recently] : normal [Follow-Up - Routine Clinic] : a routine clinic follow-up of [Excessive Daytime Sleepiness] : excessive daytime sleepiness [Snoring] : snoring [Unrefreshing Sleep] : unrefreshing sleep [Currently Experiencing] : The patient is currently experiencing symptoms. [None] : No associated symptoms are reported

## 2022-05-26 NOTE — ASSESSMENT
[FreeTextEntry1] : Probable LUIS \par Cough variant Asthma intermittent and stable on Albuterol PRN only. \par HO GERD improved on therapy and diet

## 2022-05-26 NOTE — PROCEDURE
[FreeTextEntry1] : CXR PA and Lateral \par The costophrenic and cardiophrenic angles are sharp\par The lorne parenchyma shows no infiltrates, consolidations, or nodules \par The Mediastinum is within normal limits\par No pleural effusions\par \par

## 2022-06-13 ENCOUNTER — APPOINTMENT (OUTPATIENT)
Dept: UROGYNECOLOGY | Facility: CLINIC | Age: 62
End: 2022-06-13
Payer: COMMERCIAL

## 2022-06-13 VITALS
HEIGHT: 64 IN | SYSTOLIC BLOOD PRESSURE: 129 MMHG | BODY MASS INDEX: 33.97 KG/M2 | HEART RATE: 80 BPM | DIASTOLIC BLOOD PRESSURE: 65 MMHG | WEIGHT: 199 LBS

## 2022-06-13 DIAGNOSIS — N39.490 OVERFLOW INCONTINENCE: ICD-10-CM

## 2022-06-13 DIAGNOSIS — N39.0 URINARY TRACT INFECTION, SITE NOT SPECIFIED: ICD-10-CM

## 2022-06-13 PROCEDURE — 99214 OFFICE O/P EST MOD 30 MIN: CPT | Mod: 25

## 2022-06-13 PROCEDURE — 51701 INSERT BLADDER CATHETER: CPT

## 2022-06-13 NOTE — DISCUSSION/SUMMARY
[FreeTextEntry1] : \par Overflow Incontinence-\par  cc (elevated) \par Patient happy with Flexeril 10 mg BID and Flomax 0.4 mg QHS.\par Refills provided. \par Precautions reviewed.\par Urine culture obtained.\par Will follow up.\par Will treat accordingly if necessary\par If there is no infection, may consider adjusting medication as PVR is still elevated with Flexeril 10 mg BID and Flomax 0.4 mg QHS. \par \par Atrophic Vaginitis:\par Advised to continue to apply a pea size amount of the cream to the opening of the vagina three nights per week.\par \par Recurrent UTIs\par Advised to call the office if she feels like she has an infection.

## 2022-06-13 NOTE — COUNSELING
[FreeTextEntry1] : \par If you feel like you have an infection it is important for you to call our office and we will arrange testing of your urine.\par \par We will contact you with the urine culture results.\par \par Please continue taking Flexeril 10 mg twice a day and Flomax at night. Refills sent to your pharmacy.\par \par Please continue to apply a pea size amount to the opening of the vagina three times a week. \par \par Please call my office if you have any issues with the cost or side effects of the medication. \par \par Follow up will be scheduled based on urine results\par

## 2022-06-13 NOTE — HISTORY OF PRESENT ILLNESS
[FreeTextEntry1] : Patient is here for 6 months med check/PVR check for overflow incontinence.\par Last seen on 5/2/2022 for med check.\par \par 11/29/21: BMP normal\par 12/1/21: CT: kidneys normal (some fluid by ovary, recommended pelvic sono 12/13/21: normal)\par Using estrace cream three times per week\par Using daily macrobid 100mg daily since 11/18/21\par \par History of incomplete bladder emptying and overflow incontinence\par No prolapse on exam\par Significant constipation- was using miralax and now using prunes also. \par Hx of DM (a1c 5.4%)\par h/o spinal surgery x2 in the Spring (complicated by a spinal fluid leak), + incontinence since the surgeries\par \par Underwent urodynamics 12/27/21:\par Impression: incomplete bladder emptying that is likely secondary to underactive bladder, pelvic muscle hypertonicity, urethral hypertonicity\par Plan: renal imaging, muscle relaxant +/- flomax, if no improvement then recommend intermittent self cathing \par \par Flexeril 5mg BID, still elevated PVR\par Flomax 0.4mg QHS\par \par S/p Colonoscopy\par \par Flomax 0.4 mg QHS\par Flexeril 10 mg BID\par \par Today, patient states she is happy with Flexeril 10 mg BID and Flomax 0.4 mg QHS and is noticing improvement. Denies side effects. Patient does not feel she has an infection.\par \par She stopped taking suppression as she ran out and also started two antibiotics given to her by her GI. She has been off antibiotics for 2 weeks and is okay to see how she does without suppression. \par \par She would like to continue Flexeril 10 mg BID and Flomax 0.4 mg QHS

## 2022-06-13 NOTE — PHYSICAL EXAM
[Chaperone Present] : A chaperone was present in the examining room during all aspects of the physical examination [No Acute Distress] : in no acute distress [Well developed] : well developed [Well Nourished] : ~L well nourished [FreeTextEntry1] : Urethra was prepped in sterile fashion and then a sterile catheter (14F) was used by me to drain the bladder. The patient tolerated the procedure well.\par Void: 25 cc\par PVR: 170 cc

## 2022-06-14 LAB
APPEARANCE: CLEAR
BILIRUBIN URINE: NEGATIVE
BLOOD URINE: NEGATIVE
COLOR: YELLOW
GLUCOSE QUALITATIVE U: NEGATIVE
KETONES URINE: NEGATIVE
LEUKOCYTE ESTERASE URINE: NEGATIVE
NITRITE URINE: NEGATIVE
PH URINE: 7
PROTEIN URINE: NORMAL
SPECIFIC GRAVITY URINE: 1.02
UROBILINOGEN URINE: NORMAL

## 2022-06-15 LAB — BACTERIA UR CULT: NORMAL

## 2022-07-20 ENCOUNTER — RX RENEWAL (OUTPATIENT)
Age: 62
End: 2022-07-20

## 2022-08-01 ENCOUNTER — APPOINTMENT (OUTPATIENT)
Age: 62
End: 2022-08-01

## 2022-08-02 ENCOUNTER — APPOINTMENT (OUTPATIENT)
Dept: SLEEP CENTER | Facility: HOSPITAL | Age: 62
End: 2022-08-02

## 2022-08-02 ENCOUNTER — OUTPATIENT (OUTPATIENT)
Dept: OUTPATIENT SERVICES | Facility: HOSPITAL | Age: 62
LOS: 1 days | Discharge: HOME | End: 2022-08-02

## 2022-08-02 DIAGNOSIS — J34.2 DEVIATED NASAL SEPTUM: Chronic | ICD-10-CM

## 2022-08-02 DIAGNOSIS — Z90.89 ACQUIRED ABSENCE OF OTHER ORGANS: Chronic | ICD-10-CM

## 2022-08-02 DIAGNOSIS — Z98.51 TUBAL LIGATION STATUS: Chronic | ICD-10-CM

## 2022-08-02 DIAGNOSIS — Z98.890 OTHER SPECIFIED POSTPROCEDURAL STATES: Chronic | ICD-10-CM

## 2022-08-02 PROCEDURE — 95800 SLP STDY UNATTENDED: CPT | Mod: 26

## 2022-08-03 DIAGNOSIS — G47.33 OBSTRUCTIVE SLEEP APNEA (ADULT) (PEDIATRIC): ICD-10-CM

## 2022-08-31 ENCOUNTER — APPOINTMENT (OUTPATIENT)
Age: 62
End: 2022-08-31

## 2022-08-31 VITALS
HEART RATE: 79 BPM | BODY MASS INDEX: 34.49 KG/M2 | RESPIRATION RATE: 12 BRPM | SYSTOLIC BLOOD PRESSURE: 120 MMHG | OXYGEN SATURATION: 97 % | WEIGHT: 202 LBS | HEIGHT: 64 IN | DIASTOLIC BLOOD PRESSURE: 60 MMHG

## 2022-08-31 PROCEDURE — 99214 OFFICE O/P EST MOD 30 MIN: CPT

## 2022-08-31 NOTE — ASSESSMENT
[FreeTextEntry1] : Moderate LUIS \par Cough variant Asthma intermittent and stable on Albuterol PRN only. \par HO GERD improved on therapy and diet

## 2022-08-31 NOTE — HISTORY OF PRESENT ILLNESS
[Cough Variant] : cough variant [Doing Well] : doing well [Well Controlled] : Well controlled [Cold] : cold weather [Adherent] : the patient is adherent with ~his/her~ medication regimen [Goals--Doing Well] : the patient is doing well with ~his/her~ asthma goals [Follow-Up - Routine Clinic] : a routine clinic follow-up of [Excessive Daytime Sleepiness] : excessive daytime sleepiness [Snoring] : snoring [Unrefreshing Sleep] : unrefreshing sleep [Currently Experiencing] : The patient is currently experiencing symptoms. [None] : No associated symptoms are reported [More Frequent Use Needed Recently] : normal

## 2022-09-06 ENCOUNTER — APPOINTMENT (OUTPATIENT)
Dept: ORTHOPEDIC SURGERY | Facility: CLINIC | Age: 62
End: 2022-09-06

## 2022-09-06 PROCEDURE — 99213 OFFICE O/P EST LOW 20 MIN: CPT

## 2022-09-06 PROCEDURE — 99072 ADDL SUPL MATRL&STAF TM PHE: CPT

## 2022-09-06 NOTE — ASSESSMENT
[FreeTextEntry1] :  no orthopedic intervention planned at this time light exercise is to be encouraged Naprosyn refilled tramadol as per pain management she remains totally disabled unable to work I will see her back in a few months

## 2022-09-06 NOTE — IMAGING
[de-identified] :  ambulates with a cane\par  cervical exam limited motion in rotation flexion extension some spasm head position is normal trapezius spasm bilaterally reflexes upper extremity brisk and symmetric lumbar exam diffuse spasm limited motion in flexion extension tightness in dorsal lumbar fascia and hamstrings negative straight leg bilaterally reflexes brisk and symmetric

## 2022-09-06 NOTE — HISTORY OF PRESENT ILLNESS
[de-identified] : Patient Complaint - Neck and back pain\par she is currently 89 still using Tylenol/ gabapentin she had lumbar surgery 05/27/2021 at [facility] several complications with\par 2 CSF leaks today still struggling with stiffness in the left knee replacement has seen Dr. Goyal    consideration of\par surgery for the knee is on hold   having  difficulty or bladder and bowel function also just had a cataract surgery in\par both eyes using a TENS unit for some of the knee pain

## 2022-09-06 NOTE — REASON FOR VISIT
[FreeTextEntry2] : follow up for work injury of 12/14/2018  to her neck and back and left knee still reports significant pain currently in therapy using a cane

## 2022-09-26 ENCOUNTER — RX RENEWAL (OUTPATIENT)
Age: 62
End: 2022-09-26

## 2022-09-29 ENCOUNTER — NON-APPOINTMENT (OUTPATIENT)
Age: 62
End: 2022-09-29

## 2022-09-29 ENCOUNTER — APPOINTMENT (OUTPATIENT)
Dept: OPHTHALMOLOGY | Facility: CLINIC | Age: 62
End: 2022-09-29

## 2022-09-29 PROCEDURE — 92133 CPTRZD OPH DX IMG PST SGM ON: CPT

## 2022-09-29 PROCEDURE — 92012 INTRM OPH EXAM EST PATIENT: CPT | Mod: 25

## 2022-09-29 PROCEDURE — 68761 CLOSE TEAR DUCT OPENING: CPT | Mod: E2,E4

## 2022-10-18 ENCOUNTER — RX RENEWAL (OUTPATIENT)
Age: 62
End: 2022-10-18

## 2022-11-01 ENCOUNTER — APPOINTMENT (OUTPATIENT)
Dept: PAIN MANAGEMENT | Facility: CLINIC | Age: 62
End: 2022-11-01

## 2022-11-01 ENCOUNTER — LABORATORY RESULT (OUTPATIENT)
Age: 62
End: 2022-11-01

## 2022-11-01 VITALS
HEIGHT: 64 IN | BODY MASS INDEX: 34.49 KG/M2 | DIASTOLIC BLOOD PRESSURE: 89 MMHG | WEIGHT: 202 LBS | SYSTOLIC BLOOD PRESSURE: 156 MMHG | HEART RATE: 74 BPM

## 2022-11-01 LAB — COCAINE METAB.OTHER UR-MCNC: NEGATIVE

## 2022-11-01 PROCEDURE — 80305 DRUG TEST PRSMV DIR OPT OBS: CPT | Mod: QW

## 2022-11-01 PROCEDURE — 99213 OFFICE O/P EST LOW 20 MIN: CPT

## 2022-12-16 ENCOUNTER — RX RENEWAL (OUTPATIENT)
Age: 62
End: 2022-12-16

## 2022-12-22 NOTE — DISCHARGE NOTE NURSING/CASE MANAGEMENT/SOCIAL WORK - NSTRANSFERBELONGINGSRESP_GEN_A_NUR
- Last A1C of 7.7 in 8/2022  - Continue current medication regimen: Jardiance 10mg daily + Glipizide-metformin 2.5-500mg 2tabs BID  - Recommend diabetes friendly diet   - Continue to check BG daily and keep home BG log   no

## 2022-12-28 ENCOUNTER — APPOINTMENT (OUTPATIENT)
Age: 62
End: 2022-12-28

## 2022-12-28 VITALS
BODY MASS INDEX: 33.8 KG/M2 | WEIGHT: 198 LBS | SYSTOLIC BLOOD PRESSURE: 124 MMHG | HEIGHT: 64 IN | OXYGEN SATURATION: 97 % | HEART RATE: 85 BPM | DIASTOLIC BLOOD PRESSURE: 80 MMHG | RESPIRATION RATE: 14 BRPM

## 2022-12-28 PROCEDURE — 99213 OFFICE O/P EST LOW 20 MIN: CPT

## 2022-12-28 RX ORDER — FLUTICASONE FUROATE AND VILANTEROL TRIFENATATE 100; 25 UG/1; UG/1
100-25 POWDER RESPIRATORY (INHALATION) DAILY
Qty: 1 | Refills: 3 | Status: ACTIVE | COMMUNITY
Start: 2022-12-28 | End: 1900-01-01

## 2022-12-28 NOTE — ASSESSMENT
[FreeTextEntry1] : Moderate LUIS Awaiting APAP delivery \par Cough variant Asthma acting like persistent at this time.   \par HO GERD improved on therapy and diet

## 2022-12-28 NOTE — HISTORY OF PRESENT ILLNESS
[Cough Variant] : cough variant [Doing Well] : doing well [Well Controlled] : Well controlled [More Frequent Use Needed Recently] : normal [Cold] : cold weather [Adherent] : the patient is adherent with ~his/her~ medication regimen [Goals--Doing Well] : the patient is doing well with ~his/her~ asthma goals [Follow-Up - Routine Clinic] : a routine clinic follow-up of [Excessive Daytime Sleepiness] : excessive daytime sleepiness [Snoring] : snoring [Unrefreshing Sleep] : unrefreshing sleep [Currently Experiencing] : The patient is currently experiencing symptoms. [None] : No associated symptoms are reported

## 2023-01-02 ENCOUNTER — NON-APPOINTMENT (OUTPATIENT)
Age: 63
End: 2023-01-02

## 2023-01-03 ENCOUNTER — LABORATORY RESULT (OUTPATIENT)
Age: 63
End: 2023-01-03

## 2023-01-03 ENCOUNTER — APPOINTMENT (OUTPATIENT)
Dept: PAIN MANAGEMENT | Facility: CLINIC | Age: 63
End: 2023-01-03
Payer: OTHER MISCELLANEOUS

## 2023-01-03 VITALS — BODY MASS INDEX: 33.8 KG/M2 | HEART RATE: 75 BPM | HEIGHT: 64 IN | WEIGHT: 198 LBS

## 2023-01-03 VITALS
SYSTOLIC BLOOD PRESSURE: 147 MMHG | HEART RATE: 75 BPM | DIASTOLIC BLOOD PRESSURE: 89 MMHG | BODY MASS INDEX: 33.8 KG/M2 | HEIGHT: 64 IN | WEIGHT: 198 LBS

## 2023-01-03 LAB — PM MDA: NEGATIVE

## 2023-01-03 PROCEDURE — 80305 DRUG TEST PRSMV DIR OPT OBS: CPT | Mod: QW

## 2023-01-03 PROCEDURE — 96102W: CUSTOM | Mod: ACP

## 2023-01-03 PROCEDURE — 99072 ADDL SUPL MATRL&STAF TM PHE: CPT

## 2023-01-03 PROCEDURE — 99213 OFFICE O/P EST LOW 20 MIN: CPT | Mod: ACP

## 2023-01-03 RX ORDER — NITROFURANTOIN (MONOHYDRATE/MACROCRYSTALS) 25; 75 MG/1; MG/1
100 CAPSULE ORAL DAILY
Qty: 30 | Refills: 2 | Status: DISCONTINUED | COMMUNITY
Start: 2021-11-18 | End: 2023-01-03

## 2023-01-03 NOTE — ASSESSMENT
[FreeTextEntry1] : 62-year-old female who has chronic pain s/p a work injury. She is medically managed at our office. I have refilled her Tramadol today. I will see the patient back at our office in 6-8 weeks for routine care.

## 2023-01-03 NOTE — PHYSICAL EXAM
[de-identified] : Back, including spine: Palpation of the thoracic/lumbar spine is as follows: bilateral lumbar paraspinal tenderness. Range of motion of the thoracic and lumbar spine is as follows: Diminished range of motion in all planes. Gait and function is as follows: antalgic gait and ambulation with cane.

## 2023-01-03 NOTE — PHYSICAL EXAM
[de-identified] : Back, including spine: Palpation of the thoracic/lumbar spine is as follows: bilateral lumbar paraspinal tenderness. Range of motion of the thoracic and lumbar spine is as follows: Diminished range of motion in all planes. Gait and function is as follows: antalgic gait and ambulation with cane.

## 2023-01-03 NOTE — DISCUSSION/SUMMARY
[de-identified] : I have consulted the  registry for the purpose of reviewing the patient's controlled substance.\par \par Overall there is at least a 30-50% reduction in pain with the prescribed analgesics. The patient denies any adverse side effects due to the medication (sleeping disturbance, constipation, sleepiness, hallucinations and/or urination problems). \par \par Urine drug screening collected today with rapid sample result consistent with given regimen. Sample to be sent for confirmatory testing with additional relief at a later time.\par \par The patient will return to the office in 4 weeks time and is aware to contact me with any question or concerns in the interim.\par \par Kaitlynn Alejandra PA-C\par Praveen Gleason DO\par

## 2023-01-03 NOTE — DISCUSSION/SUMMARY
[de-identified] : I have consulted the  registry for the purpose of reviewing the patient's controlled substance.\par \par Overall there is at least a 30-50% reduction in pain with the prescribed analgesics. The patient denies any adverse side effects due to the medication (sleeping disturbance, constipation, sleepiness, hallucinations and/or urination problems). \par \par Urine drug screening collected today with rapid sample result consistent with given regimen. Sample to be sent for confirmatory testing with additional relief at a later time.\par \par The patient will return to the office in 4 weeks time and is aware to contact me with any question or concerns in the interim.\par \par Kaitlynn Alejandra PA-C\par Praveen Gleason DO\par

## 2023-01-03 NOTE — HISTORY OF PRESENT ILLNESS
[FreeTextEntry1] : ORIGINAL PRESENTATION: This is a 62 year old woman complaining of right lower back pain into the back of the leg into the foot. The patient has had this pain for 5 months. The pain started after a fall. Patient describes pain as severe. During the last month the pain has been mostly during the morning. Pain described as shooting, sharp, cutting, throbbing. Pain is associated with numbness and pins and needles into the right lower extremity. Patient has weakness in the right lower extremity. Pain is increased with lying down, standing, walking, exercise. Pain is decreased with sitting down. Pain is not changed with relaxation, coughing/sneezing, bowel movements. Bowel or bladder habits have not changed.\par \par TODAY: The reason for the visit is for a continuing active encounter. She continues to suffer with lower back pain and radiculopathy. She also has chronic left knee issues and has undergone a total left knee replacement previously. She has been under the care of a rheumatologist and has been on Enbrel, which is helping her pain. She was also switched from Mobic to Naprosyn. She was referred for PT, which she did not start.\par \par She continues to take Tramadol spraingly. She also takes gabapentin and cyclobenzaprine. Her current regimen is providing her 30-40% relief. She is able to do more of her normal daily activities without having severe pain.\par \par UDS: repeated today, 11/1/122 - see separate note.

## 2023-01-03 NOTE — HISTORY OF PRESENT ILLNESS
[FreeTextEntry1] : ORIGINAL PRESENTATION: This is a 62 year old woman complaining of right lower back pain into the back of the leg into the foot. The patient has had this pain for 5 months. The pain started after a fall. Patient describes pain as severe. During the last month the pain has been mostly during the morning. Pain described as shooting, sharp, cutting, throbbing. Pain is associated with numbness and pins and needles into the right lower extremity. Patient has weakness in the right lower extremity. Pain is increased with lying down, standing, walking, exercise. Pain is decreased with sitting down. Pain is not changed with relaxation, coughing/sneezing, bowel movements. Bowel or bladder habits have not changed.\par \par TODAY: The reason for the visit is for a continuing active encounter. She continues to suffer with lower back pain and radiculopathy. She also has chronic left knee issues and has undergone a total left knee replacement previously. She continues with Enbrel, which is helping her pain. She was referred for PT, which she did not start. Patient has been taking Diclofenac prescribed to her by a neurologist and therefore she never started Naprosyn.\par \par She continues to take Tramadol sparingly. She also takes gabapentin and cyclobenzaprine. Her current regimen is providing her 30-40% relief. She is able to do more of her normal daily activities without having severe pain.\par \par UDS: 11/11/22 - Lab cancelled UDS. We will therefore repeat it today, 1/3/23 - see separate note.\par SOAPP, done today as well - LOW RISK.

## 2023-01-03 NOTE — DATA REVIEWED
[FreeTextEntry1] : IMAGING STUDIES:\par 1. MRI of the lumbar spine was performed at [facility] in November 2019. This was compared to her previous MRI that was performed at [facility] in June 2019. On my review, she has evidence of a worsening right L4-5 synovial cyst that extends from the right facet joint which is causing significant impingement upon the right lateral recess and neural foramen.\par 2. EMG nerve conduction studies demonstrated mild diffuse polyneuropathy in both lower extremities.\par 3. flexion and extension x-rays of the lumbar spine. I do not appreciate any instability or spondylolisthesis on imaging studies. There is some mild degeneration of L3-4 disc level.\par \par MRI of the lumbar IMPRESSION: Multiple foci of slight diminished T1, T2 signal within the vertebral marrow which may be due to atypical hemangiomas/bone islands, unchanged. CT lumbar spine may contribute additional specificity.Multilevel degenerative lumbar disc/facet change.Central L1-2 disc protrusion with minimal anterior thecal sac effacement.Right posterolateral L3-4 disc protrusion which abuts the right L4 nerve root. Left L3-4 foraminal disc extension which abuts/almost abuts the left L3 nerve root.Juxta-articular cyst medial to the right L4-5 facet joint which abuts the right L5 nerve root. Minimal right thecal sac effacement.No significant spinal canal stenosis.The L1-2 disc protrusion is progressive. L3-4 findings are new. The right L4-5 juxta-articular cyst is new. No significant change in the above findings otherwise.\par \par EMG/NCV : LE: mild diffuse polyneuropathy in both lower extremities.

## 2023-01-09 ENCOUNTER — APPOINTMENT (OUTPATIENT)
Dept: ORTHOPEDIC SURGERY | Facility: CLINIC | Age: 63
End: 2023-01-09
Payer: OTHER MISCELLANEOUS

## 2023-01-09 PROCEDURE — 99213 OFFICE O/P EST LOW 20 MIN: CPT

## 2023-01-09 PROCEDURE — 99072 ADDL SUPL MATRL&STAF TM PHE: CPT

## 2023-01-09 NOTE — IMAGING
[de-identified] :  usually  ambulates with a cane (lost it)\par  cervical exam limited motion in rotation flexion extension some spasm head position is normal trapezius spasm bilaterally reflexes upper extremity brisk and symmetric lumbar exam diffuse spasm limited motion in flexion extension tightness in dorsal lumbar fascia and hamstrings negative straight leg bilaterally reflexes brisk and symmetric

## 2023-01-09 NOTE — REASON FOR VISIT
[FreeTextEntry2] : follow up for work injury of 12/14/2018 to her neck and back and left knee    recently fell bruised her whole left side\par  her rheumatologist has suggested therapy as has her medical doctor she is on tramadol gabapentin Tylenol and now diclofenac lost her cane recently

## 2023-01-09 NOTE — HISTORY OF PRESENT ILLNESS
[de-identified] : Patient Complaint - Neck and back pain\par she is currently 89 still using Tylenol/ gabapentin  tramadol and diclofenac she had lumbar surgery 05/27/2021 at [facility] several complications with\par 2 CSF leaks today still struggling with stiffness in the left knee replacement has seen Dr. Goyal    consideration of\par surgery for the knee is on hold   having  difficulty or bladder and bowel function had a cataract surgery in\par both eyes using a TENS unit for some of the knee pain

## 2023-01-11 ENCOUNTER — APPOINTMENT (OUTPATIENT)
Dept: UROGYNECOLOGY | Facility: CLINIC | Age: 63
End: 2023-01-11
Payer: COMMERCIAL

## 2023-01-11 VITALS
BODY MASS INDEX: 33.8 KG/M2 | WEIGHT: 198 LBS | HEART RATE: 79 BPM | DIASTOLIC BLOOD PRESSURE: 72 MMHG | SYSTOLIC BLOOD PRESSURE: 118 MMHG | HEIGHT: 64 IN

## 2023-01-11 DIAGNOSIS — K59.00 CONSTIPATION, UNSPECIFIED: ICD-10-CM

## 2023-01-11 PROCEDURE — 99214 OFFICE O/P EST MOD 30 MIN: CPT | Mod: 25

## 2023-01-11 PROCEDURE — 51701 INSERT BLADDER CATHETER: CPT

## 2023-01-11 RX ORDER — AMITRIPTYLINE HYDROCHLORIDE 10 MG/1
10 TABLET, FILM COATED ORAL
Qty: 30 | Refills: 0 | Status: COMPLETED | COMMUNITY
Start: 2021-10-07 | End: 2023-01-11

## 2023-01-11 RX ORDER — METHOTREXATE 2.5 MG/1
2.5 TABLET ORAL
Qty: 16 | Refills: 0 | Status: COMPLETED | COMMUNITY
Start: 2021-03-10 | End: 2023-01-11

## 2023-01-11 RX ORDER — MELOXICAM 15 MG/1
15 TABLET ORAL
Qty: 30 | Refills: 0 | Status: COMPLETED | COMMUNITY
Start: 2021-10-07 | End: 2023-01-11

## 2023-01-11 RX ORDER — FLUOROMETHOLONE 1 MG/ML
0.1 SOLUTION/ DROPS OPHTHALMIC
Qty: 5 | Refills: 0 | Status: COMPLETED | COMMUNITY
Start: 2021-08-31 | End: 2023-01-11

## 2023-01-11 RX ORDER — NAPROXEN 500 MG/1
TABLET ORAL
Refills: 0 | Status: COMPLETED | COMMUNITY
End: 2023-01-11

## 2023-01-11 RX ORDER — NAPROXEN 500 MG/1
500 TABLET ORAL
Qty: 60 | Refills: 2 | Status: COMPLETED | COMMUNITY
Start: 2022-09-06 | End: 2023-01-11

## 2023-01-11 RX ORDER — SENNOSIDES 8.6 MG TABLETS 8.6 MG/1
8.6 TABLET ORAL
Qty: 30 | Refills: 0 | Status: COMPLETED | COMMUNITY
Start: 2021-05-27 | End: 2023-01-11

## 2023-01-11 NOTE — PHYSICAL EXAM
[Chaperone Present] : A chaperone was present in the examining room during all aspects of the physical examination [No Acute Distress] : in no acute distress [Well developed] : well developed [Well Nourished] : ~L well nourished [FreeTextEntry1] : Indication: Incomplete Bladder Emptying\par Urethra was prepped in sterile fashion and then a sterile non- indwelling catheter (14F) was used by me to drain the bladder. The patient tolerated the procedure well.\par Void: 90 cc\par PVR: 180 cc

## 2023-01-11 NOTE — HISTORY OF PRESENT ILLNESS
[FreeTextEntry1] : Patient is here for 6 months med check/PVR check for incomplete bladder emptying and muscle hypertonicity. \par Last seen on 6/13/2022 for med check.\par \par 11/29/21: BMP normal\par 12/1/21: CT: kidneys normal (some fluid by ovary, recommended pelvic sono 12/13/21: normal)\par Using estrace cream three times per week\par Using daily macrobid 100mg daily since 11/18/21\par \par History of incomplete bladder emptying and overflow incontinence\par No prolapse on exam\par Significant constipation- was using miralax and now using prunes also. \par Hx of DM (a1c 5.4%)\par h/o spinal surgery x2 in the Spring (complicated by a spinal fluid leak), + incontinence since the surgeries\par \par Underwent urodynamics 12/27/21:\par Impression: incomplete bladder emptying that is likely secondary to underactive bladder, pelvic muscle hypertonicity, urethral hypertonicity\par Plan: renal imaging, muscle relaxant +/- flomax, if no improvement then recommend intermittent self cathing \par \par Flexeril 5mg BID, still elevated PVR\par Flomax 0.4mg QHS\par \par S/p Colonoscopy\par \par Flomax 0.4 mg QHS\par Flexeril 10 mg BID\par estrace\par \par Today, patient states she is happy with Flexeril 10 mg twice a day, and Flomax at bedtime and is noticing improvement. She feels that she can urinate more but knows that she does not empty her bladder well. Has to push when she voids at home to empty her bladder. Denies side effects from the medications. Patient does not feel she has an infection.\par \par She reports constipation, bowel movements are consistency of hard balls. \par \par Patient would like to continue Flexeril 10 mg twice a day and Flomax at bedtime. \par

## 2023-01-11 NOTE — COUNSELING
[FreeTextEntry1] : If you feel like you have an infection it is important for you to call our office and we will arrange testing of your urine.\par \par Please continue taking Flexeril 10 mg twice a day and Flomax 0.4 mg at bedtime. Refills sent to your pharmacy.\par \par For better bowel emptying please use Benefiber start with 2 tablespoons daily and as needed add 1 teaspoon of Miralax titrate up or down to effect.\par \par Please schedule the ultrasound of the kidneys\par \par Please continue to apply a pea size amount to the opening of the vagina three times a week. \par \par Please call my office if you have any issues with the cost or side effects of the medication. \par \par Schedule a 6 months follow up med check appointment with MOHSEN Velasco or MOHSEN Grimm.\par

## 2023-01-11 NOTE — DISCUSSION/SUMMARY
[FreeTextEntry1] : \par Incomplete Bladder Emptying\par PVR elevated today\par Continue Flomax 0.4 mg QHS and Flexeril 10 mg BID\par Precautions reviewed, refills sent\par \par Discussed with the patient that we are concerned about incomplete bladder emptying because it can cause recurrent UTI and can cause kidney damage. The patient voiced understanding.\par Counseled the patient on the management options including adjusting medications (already on two medications and unlikely higher dosage will cause any change), constipation control, pelvic floor physical therapy, renal surveillance, an indwelling Jones catheter, or learning how to self-catheterize. Patient agrees to continue current medications, work on constipation control and see if she can self-catheterize. \par Patient attempted to learn self-catheterization but was unable to and did not want to continue to try to self-catheterize. She would like to monitor with renal surveillance. Renal sono ordered and patient will return in 6 months for follow up or sooner if she has any issues\par \par Urine culture obtained.\par Will follow up.\par Will treat accordingly if necessary\par \par Atrophic vaginitis\par Advised to continue using estrace cream\par \par Constipation\par Advised to try Miralax or Benefiber for constipation control\par

## 2023-01-12 ENCOUNTER — RX RENEWAL (OUTPATIENT)
Age: 63
End: 2023-01-12

## 2023-01-17 ENCOUNTER — NON-APPOINTMENT (OUTPATIENT)
Age: 63
End: 2023-01-17

## 2023-01-17 LAB — URINE CULTURE <10: ABNORMAL

## 2023-01-23 ENCOUNTER — NON-APPOINTMENT (OUTPATIENT)
Age: 63
End: 2023-01-23

## 2023-01-24 ENCOUNTER — APPOINTMENT (OUTPATIENT)
Dept: NEUROLOGY | Facility: CLINIC | Age: 63
End: 2023-01-24
Payer: COMMERCIAL

## 2023-01-24 VITALS
DIASTOLIC BLOOD PRESSURE: 79 MMHG | HEART RATE: 74 BPM | SYSTOLIC BLOOD PRESSURE: 127 MMHG | HEIGHT: 64 IN | WEIGHT: 196 LBS | BODY MASS INDEX: 33.46 KG/M2

## 2023-01-24 PROCEDURE — 99203 OFFICE O/P NEW LOW 30 MIN: CPT

## 2023-01-24 RX ORDER — TRAMADOL HYDROCHLORIDE 50 MG/1
50 TABLET, COATED ORAL
Qty: 60 | Refills: 0 | Status: DISCONTINUED | COMMUNITY
Start: 2021-10-18 | End: 2023-01-24

## 2023-01-24 RX ORDER — ESTRADIOL 0.1 MG/G
0.1 CREAM VAGINAL
Qty: 1 | Refills: 3 | Status: DISCONTINUED | COMMUNITY
Start: 2021-10-25 | End: 2023-01-24

## 2023-01-24 RX ORDER — AMITRIPTYLINE HYDROCHLORIDE 75 MG/1
TABLET, FILM COATED ORAL
Refills: 0 | Status: ACTIVE | COMMUNITY

## 2023-01-24 NOTE — DISCUSSION/SUMMARY
[FreeTextEntry1] : Ms. Maloney is a 61yo woman with headaches and lower back pains.  She has rheumatoid and osteoarthritis and takes enbrel.  She is on NSAIDs to prevent pain and tramadol for severe breathrough pain.  I discussed about continuing her PT to strengthen her core which she is doing currently.\par 1. Diclofenac 50mg BID (discussed trying to reduce however she says it helps with her arthritis as well)\par 2. Xray of cspine \par 3. Xray lumbar spine\par 4. Continue PT\par 5. F/u in 6 months

## 2023-01-24 NOTE — HISTORY OF PRESENT ILLNESS
[FreeTextEntry1] : Ms. Maloney is a 63yo woman with PMHX below here for evaluation of headaches.  She says she began having headaches in 5/20fter she had lumbar spine surgery for removal of a cyst.  After the surgery she developed a CSF leak and had to have a revision and a drain placed.  Since then she continues to get pain and saw a neurologist who placed her on gabapentin, diclofenac and tried amitriptiline.  She said the amitriptiline did not help her.  Her neurologist left the practice and she is now here to establish new neuro care\par The headaches occur in the bitemporal region and can last for a few seconds a few times per day.  Pain can be 2-5/10 in intensity.  NO associated change in vision or hearing no nausea or vomiting or dizziness.\par She takes the doclofenac regularly for the pain prevention but doesn’t want to stop because it helps with her arthritis as well.

## 2023-01-24 NOTE — REVIEW OF SYSTEMS
[As Noted in HPI] : as noted in HPI [Arthralgias] : arthralgias [Joint Pain] : joint pain [Limb Pain] : limb pain [Negative] : Heme/Lymph

## 2023-02-07 ENCOUNTER — RX RENEWAL (OUTPATIENT)
Age: 63
End: 2023-02-07

## 2023-02-14 ENCOUNTER — APPOINTMENT (OUTPATIENT)
Dept: PAIN MANAGEMENT | Facility: CLINIC | Age: 63
End: 2023-02-14
Payer: MEDICARE

## 2023-02-14 VITALS
DIASTOLIC BLOOD PRESSURE: 89 MMHG | BODY MASS INDEX: 33.46 KG/M2 | WEIGHT: 196 LBS | HEART RATE: 86 BPM | SYSTOLIC BLOOD PRESSURE: 132 MMHG | HEIGHT: 64 IN

## 2023-02-14 DIAGNOSIS — Z79.899 OTHER LONG TERM (CURRENT) DRUG THERAPY: ICD-10-CM

## 2023-02-14 PROCEDURE — 99072 ADDL SUPL MATRL&STAF TM PHE: CPT

## 2023-02-14 PROCEDURE — 99213 OFFICE O/P EST LOW 20 MIN: CPT | Mod: ACP

## 2023-02-14 RX ORDER — ETANERCEPT 50 MG/ML
50 SOLUTION SUBCUTANEOUS
Refills: 0 | Status: ACTIVE | COMMUNITY

## 2023-02-14 NOTE — DISCUSSION/SUMMARY
[de-identified] : I have consulted the  registry for the purpose of reviewing the patient's controlled substance.\par \par Overall there is at least a 30-50% reduction in pain with the prescribed analgesics. The patient denies any adverse side effects due to the medication (sleeping disturbance, constipation, sleepiness, hallucinations and/or urination problems). \par The patient will return to the office in 4 weeks time and is aware to contact me with any question or concerns in the interim.\par \par Kaitlynn Alejandra PA-C\par Praveen Gleason DO\par

## 2023-02-14 NOTE — PHYSICAL EXAM
[de-identified] : Back, including spine: Palpation of the thoracic/lumbar spine is as follows: bilateral lumbar paraspinal tenderness. Range of motion of the thoracic and lumbar spine is as follows: Diminished range of motion in all planes. Gait and function is as follows: antalgic gait and ambulation with cane.

## 2023-02-14 NOTE — HISTORY OF PRESENT ILLNESS
[FreeTextEntry1] : ORIGINAL PRESENTATION: This is a 62 year old woman complaining of right lower back pain into the back of the leg into the foot. The patient has had this pain for 5 months. The pain started after a fall. Patient describes pain as severe. During the last month the pain has been mostly during the morning. Pain described as shooting, sharp, cutting, throbbing. Pain is associated with numbness and pins and needles into the right lower extremity. Patient has weakness in the right lower extremity. Pain is increased with lying down, standing, walking, exercise. Pain is decreased with sitting down. Pain is not changed with relaxation, coughing/sneezing, bowel movements. Bowel or bladder habits have not changed.\par \par TODAY: The reason for the visit is for a continuing active encounter. She continues to suffer with lower back pain and radiculopathy. She also has chronic left knee issues and has undergone a total left knee replacement previously. She sees her rheumatologist routinely. She continues with Enbrel, which is helping her pain. She has started PT and is working on strengthening her muscles. \par \par She continues to take Tramadol sparingly. She also takes gabapentin and cyclobenzaprine. Patient has been taking Diclofenac prescribed to her by a neurologist. Her current regimen is providing her 30-40% relief. She is able to do more of her normal daily activities without having severe pain.\par \par UDS: 1/3/23.\par SOAPP - LOW RISK.

## 2023-02-15 ENCOUNTER — RX RENEWAL (OUTPATIENT)
Age: 63
End: 2023-02-15

## 2023-03-28 ENCOUNTER — APPOINTMENT (OUTPATIENT)
Dept: PAIN MANAGEMENT | Facility: CLINIC | Age: 63
End: 2023-03-28
Payer: OTHER MISCELLANEOUS

## 2023-03-28 VITALS — WEIGHT: 196 LBS | HEIGHT: 64 IN | BODY MASS INDEX: 33.46 KG/M2

## 2023-03-28 DIAGNOSIS — M50.90 CERVICAL DISC DISORDER, UNSPECIFIED, UNSPECIFIED CERVICAL REGION: ICD-10-CM

## 2023-03-28 DIAGNOSIS — M51.9 UNSPECIFIED THORACIC, THORACOLUMBAR AND LUMBOSACRAL INTERVERTEBRAL DISC DISORDER: ICD-10-CM

## 2023-03-28 DIAGNOSIS — G89.4 CHRONIC PAIN SYNDROME: ICD-10-CM

## 2023-03-28 PROCEDURE — 99214 OFFICE O/P EST MOD 30 MIN: CPT

## 2023-03-29 ENCOUNTER — RX RENEWAL (OUTPATIENT)
Age: 63
End: 2023-03-29

## 2023-03-30 ENCOUNTER — APPOINTMENT (OUTPATIENT)
Dept: OPHTHALMOLOGY | Facility: CLINIC | Age: 63
End: 2023-03-30
Payer: COMMERCIAL

## 2023-03-30 ENCOUNTER — NON-APPOINTMENT (OUTPATIENT)
Age: 63
End: 2023-03-30

## 2023-03-30 PROCEDURE — 92014 COMPRE OPH EXAM EST PT 1/>: CPT | Mod: 25

## 2023-03-30 PROCEDURE — 92250 FUNDUS PHOTOGRAPHY W/I&R: CPT

## 2023-03-30 PROCEDURE — 68761 CLOSE TEAR DUCT OPENING: CPT | Mod: E2,E4

## 2023-04-03 NOTE — HISTORY OF PRESENT ILLNESS
[FreeTextEntry1] : ORIGINAL PRESENTATION: This is a 62 year old woman complaining of right lower back pain into the back of the leg into the foot. The patient has had this pain for 5 months. The pain started after a fall. Patient describes pain as severe. During the last month the pain has been mostly during the morning. Pain described as shooting, sharp, cutting, throbbing. Pain is associated with numbness and pins and needles into the right lower extremity. Patient has weakness in the right lower extremity. Pain is increased with lying down, standing, walking, exercise. Pain is decreased with sitting down. Pain is not changed with relaxation, coughing/sneezing, bowel movements. Bowel or bladder habits have not changed.\par \par TODAY: Patient presents for a follow up encounter. She continues to receive treatment for head, face and nose pain after a work-related injury that took place on 12/14/18. She reports falling and hitting her head and face against a wall in a bathroom at work. The patient reports loss of consciousness. An MRI of the brain taken 2/15/19 was normal and showed no abnormalities. She continues experience pain, as she was referred to use by Dr. Sotelo. We will continue to provide her Tramadol for symptom management, as it improves her quality of life and allows her to function and ambulate. \par \par Of note, patient had another work-related injury on 2/21/03 involving the left knee. She continues to receive care by Dr. Sotelo in orthopedics. \par \par With regard to her low back, her low back is covered under her private insurance. \par \par Last UDS:1/3/23 - Consistent \par

## 2023-04-03 NOTE — PHYSICAL EXAM
[de-identified] : Back, including spine: Palpation of the thoracic/lumbar spine is as follows: bilateral lumbar paraspinal tenderness. Range of motion of the thoracic and lumbar spine is as follows: Diminished range of motion in all planes. Gait and function is as follows: antalgic gait and ambulation with cane.

## 2023-04-03 NOTE — DISCUSSION/SUMMARY
[de-identified] : Ms. Maloney is 62 year old woman reciving active care for her chronic pain. She has constant head, face and nose pain post work-related injury that took place on 12/14/18. She reports falling and hitting her head and face against a wall in a bathroom at work. For symptom management, we prescribe her Tramadol 50mg as nit improves her quality of life. She will follow up with us in 4 weeks for med refill.\par \par Last UDS:1/3/23 - Consistent \par \par I, Amina Watkins, attest that this documentation has been prepared under the direction and in the presence of Provider Telly Gleason DO\par The documentation recorded by the scribe, in my presence, accurately reflects the service I personally performed, and the decisions made by me with my edits as appropriate.\par \par \par Best Regards,  \par \par \par Praveen Gleason D.O.  \par \par Diplomat, American Board of Anesthesiology  \par \par Diplomat, American Board of Pain Medicine  \par \par Diplomat, American Board of Pain Management\par

## 2023-04-18 ENCOUNTER — APPOINTMENT (OUTPATIENT)
Dept: PULMONOLOGY | Facility: CLINIC | Age: 63
End: 2023-04-18
Payer: COMMERCIAL

## 2023-04-24 ENCOUNTER — APPOINTMENT (OUTPATIENT)
Dept: ORTHOPEDIC SURGERY | Facility: CLINIC | Age: 63
End: 2023-04-24
Payer: OTHER MISCELLANEOUS

## 2023-04-24 PROCEDURE — 99213 OFFICE O/P EST LOW 20 MIN: CPT

## 2023-04-24 NOTE — ASSESSMENT
[FreeTextEntry1] :  no orthopedic intervention planned at this time light exercise is to be encouraged tramadol as per pain management she remains totally disabled unable to work I will see her back in a few months  will consider starting some therapy  will make follow-up with F   last visit August 2021

## 2023-04-24 NOTE — HISTORY OF PRESENT ILLNESS
[de-identified] : Patient Complaint - Neck and back pain\par she is currently 89 still using Tylenol/ gabapentin  tramadol and diclofenac she had lumbar surgery 05/27/2021 at [facility] several complications with\par 2 CSF leaks today still struggling with stiffness in the left knee replacement has seen Dr. Goyal    consideration of\par surgery for the knee is on hold   having  difficulty or bladder and bowel function had a cataract surgery in\par both eyes using a TENS unit for some of the knee pain

## 2023-04-24 NOTE — REASON FOR VISIT
[FreeTextEntry2] : Patient is coming in today for WC 12/14/2018 neck, back, left knee. Still seeing pain management for gabapentin and tramadol

## 2023-04-24 NOTE — IMAGING
[de-identified] :  usually  ambulates with a cane (lost it)\par  cervical exam limited motion in rotation flexion extension some spasm head position is normal trapezius spasm bilaterally reflexes upper extremity brisk and symmetric lumbar exam diffuse spasm limited motion in flexion extension tightness in dorsal lumbar fascia and hamstrings negative straight leg bilaterally reflexes brisk and symmetric

## 2023-05-02 ENCOUNTER — RX RENEWAL (OUTPATIENT)
Age: 63
End: 2023-05-02

## 2023-05-02 ENCOUNTER — APPOINTMENT (OUTPATIENT)
Dept: PAIN MANAGEMENT | Facility: CLINIC | Age: 63
End: 2023-05-02
Payer: OTHER MISCELLANEOUS

## 2023-05-02 VITALS
BODY MASS INDEX: 33.46 KG/M2 | HEART RATE: 87 BPM | DIASTOLIC BLOOD PRESSURE: 89 MMHG | WEIGHT: 196 LBS | HEIGHT: 64 IN | SYSTOLIC BLOOD PRESSURE: 128 MMHG

## 2023-05-02 PROCEDURE — 99214 OFFICE O/P EST MOD 30 MIN: CPT

## 2023-05-02 NOTE — HISTORY OF PRESENT ILLNESS
[FreeTextEntry1] : ORIGINAL PRESENTATION: This is a 62 year old woman with a chief complaint of head, face and nose pain which started after a work-related injury that took place on 12/14/18. She continues to have other areas of chronic pain secondary to her work related injury. \par \par TODAY: Patient presents for a follow up encounter. She continues to receive treatment for head, face and nose pain after a work-related injury that took place on 12/14/18. She reports falling and hitting her head and face against a wall in a bathroom at work. The patient reports loss of consciousness. An MRI of the brain taken 2/15/19 was normal and showed no abnormalities. She continues experience pain, as she was referred to us by Dr. Sotelo. We will continue to provide her Tramadol for symptom management, as it improves her quality of life and allows her to function and ambulate. \par \par Of note, patient had another work-related injury on 2/21/03 involving the left knee. She continues to receive care by Dr. Sotelo in orthopedics. \par \par Of note, her low back is covered under her private insurance. \par \par Last UDS:1/3/23 - Consistent \par

## 2023-05-02 NOTE — DISCUSSION/SUMMARY
[de-identified] : Ms. Maloney is 62 year old woman receiving active care for her chronic pain. She has constant head, face and nose pain secondary to a work-related injury that took place on 12/14/18. She reports falling and hitting her head and face against a wall in a bathroom at work. For symptom management, we prescribe her Tramadol 50mg as nit improves her quality of life. Overall there is at least a 30-50% reduction in pain with the prescribed analgesics. The patient denies any adverse side effects due to the medication (sleeping disturbance, constipation, sleepiness, hallucinations and/or urination problems). She will follow up with us in 4 weeks for med refill.\par \par Of note, patient is scheduled to undergo a second left knee surgery under the care of Dr. Crowley for a worker's comp injury which took place in 2003. \par \par It should also be noted she is treated for her lower back pain under her private insurance. \par \par Last UDS: 1/3/23 - Consistent \par \par I, Teetee Coles, attest that this documentation has been prepared under the direction and in the presence of Provider Praveen Gleason DO\par The documentation recorded by the scribe, in my presence, accurately reflects the service I personally performed, and the decisions made by me with my edits as appropriate.\par \par Best Regards, \par CORINA Crowe.[place]. \par Diplomat, American Board of Anesthesiology \par Diplomat, American Board of Pain Medicine \par Diplomat, American Board of Pain Management \par

## 2023-05-02 NOTE — PHYSICAL EXAM
[de-identified] : Back, including spine: Palpation of the thoracic/lumbar spine is as follows: bilateral lumbar paraspinal tenderness. Range of motion of the thoracic and lumbar spine is as follows: Diminished range of motion in all planes. Gait and function is as follows: antalgic gait and ambulation with cane.

## 2023-05-31 ENCOUNTER — RX RENEWAL (OUTPATIENT)
Age: 63
End: 2023-05-31

## 2023-06-08 ENCOUNTER — APPOINTMENT (OUTPATIENT)
Dept: PULMONOLOGY | Facility: CLINIC | Age: 63
End: 2023-06-08
Payer: COMMERCIAL

## 2023-06-08 ENCOUNTER — APPOINTMENT (OUTPATIENT)
Dept: ORTHOPEDIC SURGERY | Facility: CLINIC | Age: 63
End: 2023-06-08
Payer: OTHER MISCELLANEOUS

## 2023-06-08 VITALS
SYSTOLIC BLOOD PRESSURE: 130 MMHG | HEIGHT: 64 IN | RESPIRATION RATE: 14 BRPM | WEIGHT: 188 LBS | HEART RATE: 79 BPM | DIASTOLIC BLOOD PRESSURE: 80 MMHG | BODY MASS INDEX: 32.1 KG/M2 | OXYGEN SATURATION: 95 %

## 2023-06-08 PROCEDURE — 99213 OFFICE O/P EST LOW 20 MIN: CPT | Mod: 25

## 2023-06-08 PROCEDURE — 99214 OFFICE O/P EST MOD 30 MIN: CPT

## 2023-06-08 PROCEDURE — 73560 X-RAY EXAM OF KNEE 1 OR 2: CPT | Mod: 50

## 2023-06-08 PROCEDURE — 20610 DRAIN/INJ JOINT/BURSA W/O US: CPT | Mod: RT

## 2023-06-08 RX ORDER — BUDESONIDE AND FORMOTEROL FUMARATE DIHYDRATE 160; 4.5 UG/1; UG/1
160-4.5 AEROSOL RESPIRATORY (INHALATION) TWICE DAILY
Qty: 1 | Refills: 5 | Status: ACTIVE | COMMUNITY
Start: 2023-06-08 | End: 1900-01-01

## 2023-06-08 NOTE — HISTORY OF PRESENT ILLNESS
[de-identified] : Right knee pain/Left knee replacement with pain\par Left tka in 2019, still painful but symtpoms stable\par \par Reports right knee pain not well controlled with medication and therapeutic modalities alone.\par Notes difficulty with ADL's secondary to knee pain.\par Pain related to activity but only partially improved with rest, medications, modalities.\par \par Past Medical History is unchanged, all medical issues and medications are stable.\par \par Review of systems is negative for fevers, chills, chest pain, shortness of breath, unexplained weight fluctuations, GI or  symptoms.\par \par Smoking history and social habits are unchanged.\par \par Right Knee:\par JLT noted, guarding with ROM, decreased quad extension strength, no contracture, no gross instability.\par \par \par Imaging:\par X-rays were reviewed with the patient.  \par AP and Lateral views were obtained of the knees, including the contralateral side for comparison purposes.\par X-rays are negative for acute bone or soft tissue trauma.\par Left tka in stable position, right knee OA\par \par Impression is right knee pain likely do to degenerative changes and internal derangement.\par \par Plan for right knee injection\par \par Prior to injection, risks and benefits of the procedure were discussed, including but not limited to pain, swelling, failure to improve symptoms and in rare cases infection or allergic reaction. \par The right knee was prepped and draped with betadine and alcohol.  Using sterile technique 1cc of 40mg/cc kenalog solution mixed with 4cc of 1% lidocaine was injected thru an anterolateral portal using a 22guage needle.  Upon withdrawing the needle pressure was applied with to the injection site for approximately 1 minute.  Once hemostasis was achieved a band-aid dressing was applied.  The patient tolerated the procedure well.\par \par followup in 4-6 months or as needed.\par \par  I have personally performed a face to face diagnostic evaluation on this patient. I have reviewed the ACP note and agree with the history, exam and plan of care, except as noted.

## 2023-06-08 NOTE — ASSESSMENT
[FreeTextEntry1] : Moderate LUIS  Awaiting APAP delivery.  Did not have DME coverage.  Now has MCR\par Cough variant Asthma acting like persistent well compensated on Breo 100 \par HO GERD improved on therapy and diet

## 2023-06-12 ENCOUNTER — APPOINTMENT (OUTPATIENT)
Dept: PAIN MANAGEMENT | Facility: CLINIC | Age: 63
End: 2023-06-12
Payer: OTHER MISCELLANEOUS

## 2023-06-12 VITALS
DIASTOLIC BLOOD PRESSURE: 82 MMHG | SYSTOLIC BLOOD PRESSURE: 117 MMHG | BODY MASS INDEX: 32.1 KG/M2 | HEIGHT: 64 IN | HEART RATE: 77 BPM | WEIGHT: 188 LBS

## 2023-06-12 PROCEDURE — 99213 OFFICE O/P EST LOW 20 MIN: CPT | Mod: ACP

## 2023-06-12 NOTE — PHYSICAL EXAM
[de-identified] : Back, including spine: Palpation of the thoracic/lumbar spine is as follows: bilateral lumbar paraspinal tenderness. Range of motion of the thoracic and lumbar spine is as follows: Diminished range of motion in all planes. Gait and function is as follows: antalgic gait and ambulation with cane.

## 2023-06-12 NOTE — DISCUSSION/SUMMARY
[Medication Risks Reviewed] : Medication risks reviewed [de-identified] : Ms. Maloney is 62 year old woman receiving active care for her chronic pain. She has constant head, face and nose pain secondary to a work-related injury that took place on 12/14/18. She reports falling and hitting her head and face against a wall in a bathroom at work. For symptom management, we prescribe her Tramadol 50mg as nit improves her quality of life. Overall there is at least a 30-50% reduction in pain with the prescribed analgesics. The patient denies any adverse side effects due to the medication (sleeping disturbance, constipation, sleepiness, hallucinations and/or urination problems). She will follow up with us in 4 weeks for med refill.\par \par Of note, patient is scheduled to undergo a second left knee surgery under the care of Dr. Crowley for a worker's comp injury which took place in 2003. \par \par It should also be noted she is treated for her lower back pain under her private insurance. \par \par Last UDS: 1/3/23 - Consistent \par \par \par Oz Villanueva PA-C\par Praveen Gleason D.O. \par \par

## 2023-06-12 NOTE — HISTORY OF PRESENT ILLNESS
[FreeTextEntry1] : ORIGINAL PRESENTATION: This is a 62 year old woman with a chief complaint of head, face and nose pain which started after a work-related injury that took place on 12/14/18. She continues to have other areas of chronic pain secondary to her work related injury. \par \par TODAY: Last seen on 05/02/2023 and since then there has been no new complaints or acute changes. She continues to receive treatment for head, face and nose pain after a work-related injury that took place on 12/14/18. She reports falling and hitting her head and face against a wall in a bathroom at work. The patient reports loss of consciousness. An MRI of the brain taken 2/15/19 was normal and showed no abnormalities. She continues experience pain, as she was referred to us by Dr. Sotelo. We will continue to provide her Tramadol for symptom management, as it improves her quality of life and allows her to function and ambulate. Patient uses cane for ambulation.\par \par Of note, patient had another work-related injury on 2/21/03 involving the left knee. She continues to receive care by Dr. Sotelo in orthopedics. \par \par Of note, her low back is covered under her private insurance. \par \par Last UDS:1/3/23 - Consistent \par

## 2023-06-16 ENCOUNTER — RX RENEWAL (OUTPATIENT)
Age: 63
End: 2023-06-16

## 2023-07-21 ENCOUNTER — APPOINTMENT (OUTPATIENT)
Dept: PAIN MANAGEMENT | Facility: CLINIC | Age: 63
End: 2023-07-21
Payer: MEDICARE

## 2023-07-21 VITALS
HEART RATE: 80 BPM | BODY MASS INDEX: 32.1 KG/M2 | SYSTOLIC BLOOD PRESSURE: 133 MMHG | HEIGHT: 64 IN | DIASTOLIC BLOOD PRESSURE: 89 MMHG | WEIGHT: 188 LBS

## 2023-07-21 PROCEDURE — 99213 OFFICE O/P EST LOW 20 MIN: CPT | Mod: ACP

## 2023-07-21 NOTE — HISTORY OF PRESENT ILLNESS
[FreeTextEntry1] : ORIGINAL PRESENTATION: This is a 63 year old woman with a chief complaint of head, face and nose pain which started after a work-related injury that took place on 12/14/18. She continues to have other areas of chronic pain secondary to her work related injury. \par \par TODAY: Last seen on 06/12/2023 and since then there has been no new complaints or acute changes. She continues to receive treatment for head, face and nose pain after a work-related injury that took place on 12/14/18. She reports falling and hitting her head and face against a wall in a bathroom at work. The patient reports loss of consciousness. An MRI of the brain taken 2/15/19 was normal and showed no abnormalities. She continues experience pain, as she was referred to us by Dr. Sotelo. We will continue to provide her Tramadol for symptom management, as it improves her quality of life and allows her to function and ambulate. Patient uses cane for ambulation.\par \par Of note, patient had another work-related injury on 2/21/03 involving the left knee. She continues to receive care by Dr. Sotelo in orthopedics. \par \par Of note, her low back is covered under her private insurance. \par \par Of note: patient lost her mother two weeks ago and is going thru grieving process at this time.\par Last UDS:1/3/23 - Consistent \par

## 2023-07-21 NOTE — PHYSICAL EXAM
[de-identified] : Back, including spine: Palpation of the thoracic/lumbar spine is as follows: bilateral lumbar paraspinal tenderness. Range of motion of the thoracic and lumbar spine is as follows: Diminished range of motion in all planes. Gait and function is as follows: antalgic gait and ambulation with cane.

## 2023-07-21 NOTE — DISCUSSION/SUMMARY
[Medication Risks Reviewed] : Medication risks reviewed [de-identified] : Ms. Maloney is 63 year old woman receiving active care for her chronic pain. She has constant head, face and nose pain secondary to a work-related injury that took place on 12/14/18. She reports falling and hitting her head and face against a wall in a bathroom at work. For symptom management, we prescribe her Tramadol 50mg as nit improves her quality of life. Overall there is at least a 30-50% reduction in pain with the prescribed analgesics. The patient denies any adverse side effects due to the medication (sleeping disturbance, constipation, sleepiness, hallucinations and/or urination problems). She will follow up with us in 4 weeks for med refill.\par \par Of note, patient is scheduled to undergo a second left knee surgery under the care of Dr. Crowley for a worker's comp injury which took place in 2003. \par \par It should also be noted she is treated for her lower back pain under her private insurance. \par \par Last UDS: 1/3/23 - Consistent \par \par \par Oz Villanueva PA-C\par Praveen Gleason D.O. \par \par

## 2023-07-25 ENCOUNTER — APPOINTMENT (OUTPATIENT)
Dept: UROGYNECOLOGY | Facility: CLINIC | Age: 63
End: 2023-07-25
Payer: COMMERCIAL

## 2023-07-25 VITALS
DIASTOLIC BLOOD PRESSURE: 75 MMHG | WEIGHT: 188 LBS | HEIGHT: 64 IN | BODY MASS INDEX: 32.1 KG/M2 | HEART RATE: 78 BPM | SYSTOLIC BLOOD PRESSURE: 109 MMHG

## 2023-07-25 DIAGNOSIS — N95.2 POSTMENOPAUSAL ATROPHIC VAGINITIS: ICD-10-CM

## 2023-07-25 PROCEDURE — 99214 OFFICE O/P EST MOD 30 MIN: CPT

## 2023-07-25 RX ORDER — DICLOFENAC SODIUM 50 MG/1
50 TABLET, DELAYED RELEASE ORAL
Qty: 60 | Refills: 0 | Status: COMPLETED | COMMUNITY
Start: 2021-10-07 | End: 2023-07-25

## 2023-07-25 RX ORDER — DULAGLUTIDE 3 MG/.5ML
3 INJECTION, SOLUTION SUBCUTANEOUS
Qty: 6 | Refills: 0 | Status: COMPLETED | COMMUNITY
Start: 2021-06-21 | End: 2023-07-25

## 2023-07-25 NOTE — COUNSELING
[FreeTextEntry1] : If you feel like you have an infection it is important for you to call our office and we will arrange testing of your urine.\par \par Please continue taking Flexeril 10 mg twice a day and Flomax 0.4 mg at bedtime. Refills sent to your pharmacy.\par \par Please continue to apply a pea size amount to the opening of the vagina three times a week. \par \par Please schedule the ultrasound of the kidneys\par \par Please call my office if you have any issues with the cost or side effects of the medication. \par \par Schedule a 6 months follow up med check appointment.\par

## 2023-07-25 NOTE — DISCUSSION/SUMMARY
[FreeTextEntry1] : \par Incomplete Bladder Emptying\par Patient would like to continue current medications, does not want to self cath at this time. Does not want Jones catheter. \par Will send refills Flomax and Flexeril 10 mg BID\par precautions reviewed\par Will return for follow up in 6-8 weeks or earlier if she has any issues\par Renal sono ordered, last sono 1/23\par \par Muscle hypertonicity\par continue flexeril 10 mg BID\par \par Atrophic vaginitis\par continue estrace cream three times per week\par \par

## 2023-07-25 NOTE — HISTORY OF PRESENT ILLNESS
[FreeTextEntry1] : Patient is here for 6 months med check for incomplete bladder emptying and muscle hypertonicity. \par Last seen on 1/11/2023 for med check/PVR check.\par \par 11/29/21: BMP normal\par 12/1/21: CT: kidneys normal (some fluid by ovary, recommended pelvic sono 12/13/21: normal)\par Using estrace cream three times per week\par Using daily macrobid 100mg daily since 11/18/21\par \par History of incomplete bladder emptying and overflow incontinence\par No prolapse on exam\par Significant constipation- was using miralax and now using prunes also. \par Hx of DM (a1c 5.4%)\par h/o spinal surgery x2 in the Spring (complicated by a spinal fluid leak), + incontinence since the surgeries\par \par Underwent urodynamics 12/27/21:\par Impression: incomplete bladder emptying that is likely secondary to underactive bladder, pelvic muscle hypertonicity, urethral hypertonicity\par Plan: renal imaging, muscle relaxant +/- flomax, if no improvement then recommend intermittent self cathing \par \par Flexeril 5mg BID, still elevated PVR\par Flomax 0.4mg QHS\par \par S/p Colonoscopy\par \par Flomax 0.4 mg QHS\par Flexeril 10 mg BID\par estrace\par \par Last visit:  cc on the above medications, patient tried but was unable to self cath, chose to continue current medications and renal surveillance\par \par 1/13/2023 renal ultrasound: no hydronephrosis bilaterally \par \par Today, patient states she is happy with Flexeril 10 mg twice a day, and Flomax at bedtime and is noticing improvement. She feels that she can urinate more. Denies side effects from the medications. Patient does not feel she has an infection. She does not want to self cath after attempting last visit. \par \par Notes that her constipation is better controlled at this time. \par \par Patient would like to continue Flexeril 10 mg twice a day and Flomax at bedtime. \par

## 2023-07-31 ENCOUNTER — OUTPATIENT (OUTPATIENT)
Dept: OUTPATIENT SERVICES | Facility: HOSPITAL | Age: 63
LOS: 1 days | End: 2023-07-31
Payer: COMMERCIAL

## 2023-07-31 DIAGNOSIS — J34.2 DEVIATED NASAL SEPTUM: Chronic | ICD-10-CM

## 2023-07-31 DIAGNOSIS — Z98.51 TUBAL LIGATION STATUS: Chronic | ICD-10-CM

## 2023-07-31 DIAGNOSIS — Z90.89 ACQUIRED ABSENCE OF OTHER ORGANS: Chronic | ICD-10-CM

## 2023-07-31 DIAGNOSIS — Z00.8 ENCOUNTER FOR OTHER GENERAL EXAMINATION: ICD-10-CM

## 2023-07-31 DIAGNOSIS — Z98.890 OTHER SPECIFIED POSTPROCEDURAL STATES: Chronic | ICD-10-CM

## 2023-07-31 DIAGNOSIS — R33.9 RETENTION OF URINE, UNSPECIFIED: ICD-10-CM

## 2023-07-31 PROCEDURE — 76775 US EXAM ABDO BACK WALL LIM: CPT | Mod: 26

## 2023-07-31 PROCEDURE — 76775 US EXAM ABDO BACK WALL LIM: CPT

## 2023-08-01 DIAGNOSIS — R33.9 RETENTION OF URINE, UNSPECIFIED: ICD-10-CM

## 2023-08-08 ENCOUNTER — RX RENEWAL (OUTPATIENT)
Age: 63
End: 2023-08-08

## 2023-08-23 ENCOUNTER — APPOINTMENT (OUTPATIENT)
Dept: ORTHOPEDIC SURGERY | Facility: CLINIC | Age: 63
End: 2023-08-23
Payer: OTHER MISCELLANEOUS

## 2023-08-23 PROCEDURE — 99213 OFFICE O/P EST LOW 20 MIN: CPT

## 2023-08-23 NOTE — IMAGING
[de-identified] :  usually  ambulates with a cane   cervical exam limited motion in rotation flexion extension some spasm head position is normal trapezius spasm bilaterally reflexes upper extremity brisk and symmetric lumbar exam diffuse spasm limited motion in flexion extension tightness in dorsal lumbar fascia and hamstrings negative straight leg bilaterally reflexes brisk and symmetric Left knee some stiffness healed incision no swelling  Right knee crepitus mild swelling mild pain medially

## 2023-08-23 NOTE — REASON FOR VISIT
[FreeTextEntry2] : WC DOI  12/14/18 cervical and lumbar spine left knee still seeing pain management group still takes tramadol and gabapentin on occasion recently saw Dr. Melendrez who did a right knee injection cortisone which helped

## 2023-08-23 NOTE — HISTORY OF PRESENT ILLNESS
[de-identified] : Patient Complaint - Neck and back pain\par  she is currently 89 still using Tylenol/ gabapentin  tramadol and diclofenac she had lumbar surgery 05/27/2021 at [facility] several complications with\par  2 CSF leaks today still struggling with stiffness in the left knee replacement has seen Dr. Goyal    consideration of\par  surgery for the knee is on hold   having  difficulty or bladder and bowel function had a cataract surgery in\par  both eyes using a TENS unit for some of the knee pain

## 2023-08-23 NOTE — ASSESSMENT
[FreeTextEntry1] :  no orthopedic intervention planned at this time light exercise is to be encouraged tramadol as per pain management she remains totally disabled unable to work I will see her back in a few months  will consider starting some therapy   did have a follow-up with F   no intervention planned for either knee

## 2023-08-30 ENCOUNTER — APPOINTMENT (OUTPATIENT)
Dept: ORTHOPEDIC SURGERY | Facility: CLINIC | Age: 63
End: 2023-08-30
Payer: COMMERCIAL

## 2023-08-30 VITALS — WEIGHT: 183 LBS | BODY MASS INDEX: 30.86 KG/M2 | HEIGHT: 64.5 IN

## 2023-08-30 DIAGNOSIS — S93.492A SPRAIN OF OTHER LIGAMENT OF LEFT ANKLE, INITIAL ENCOUNTER: ICD-10-CM

## 2023-08-30 PROCEDURE — 99213 OFFICE O/P EST LOW 20 MIN: CPT

## 2023-08-30 PROCEDURE — 73610 X-RAY EXAM OF ANKLE: CPT | Mod: LT

## 2023-08-30 NOTE — DISCUSSION/SUMMARY
[de-identified] : I would like the patient at this time to start physical therapy.  I reassured her that this will likely improve with PT.  If it does not and I will see her back in 4 to 6 weeks at which time we will discuss obtaining an MRI.  All questions answered.

## 2023-08-30 NOTE — PHYSICAL EXAM
[de-identified] : She is alert and oriented x3.  Pleasant cooperative.  I examined her left lower extremity.  She is tender over the peroneal tendons.  There is full strength.  There is minimal soft tissue swelling.  Grossly intact range of motion.  Neurovascular intact distally

## 2023-08-30 NOTE — HISTORY OF PRESENT ILLNESS
[de-identified] : This is a 63-year-old patient coming to see me in regards to her left ankle.  She had a sprain back in December.  At that time she did not treated with any formal intervention.  Currently she has localized pain over the peroneal tendons especially with activity.  For the most part she has no issues with activities of daily living.

## 2023-10-05 ENCOUNTER — RX RENEWAL (OUTPATIENT)
Age: 63
End: 2023-10-05

## 2023-10-09 ENCOUNTER — NON-APPOINTMENT (OUTPATIENT)
Age: 63
End: 2023-10-09

## 2023-10-09 ENCOUNTER — APPOINTMENT (OUTPATIENT)
Dept: OPHTHALMOLOGY | Facility: CLINIC | Age: 63
End: 2023-10-09
Payer: MEDICARE

## 2023-10-09 PROCEDURE — 68761 CLOSE TEAR DUCT OPENING: CPT | Mod: E2,E4

## 2023-10-09 PROCEDURE — 92012 INTRM OPH EXAM EST PATIENT: CPT | Mod: 25

## 2023-10-09 PROCEDURE — 92133 CPTRZD OPH DX IMG PST SGM ON: CPT

## 2023-10-12 ENCOUNTER — APPOINTMENT (OUTPATIENT)
Dept: PULMONOLOGY | Facility: CLINIC | Age: 63
End: 2023-10-12
Payer: MEDICARE

## 2023-10-12 VITALS
HEIGHT: 64 IN | SYSTOLIC BLOOD PRESSURE: 132 MMHG | BODY MASS INDEX: 31.07 KG/M2 | OXYGEN SATURATION: 95 % | DIASTOLIC BLOOD PRESSURE: 84 MMHG | HEART RATE: 85 BPM | WEIGHT: 182 LBS | RESPIRATION RATE: 14 BRPM

## 2023-10-12 PROCEDURE — 94010 BREATHING CAPACITY TEST: CPT

## 2023-10-12 PROCEDURE — 94727 GAS DIL/WSHOT DETER LNG VOL: CPT

## 2023-10-12 PROCEDURE — 94729 DIFFUSING CAPACITY: CPT

## 2023-10-12 PROCEDURE — 99214 OFFICE O/P EST MOD 30 MIN: CPT | Mod: 25

## 2023-10-13 ENCOUNTER — APPOINTMENT (OUTPATIENT)
Dept: PAIN MANAGEMENT | Facility: CLINIC | Age: 63
End: 2023-10-13
Payer: MEDICARE

## 2023-10-13 VITALS — WEIGHT: 182 LBS | BODY MASS INDEX: 31.07 KG/M2 | HEIGHT: 64 IN

## 2023-10-13 PROCEDURE — 99213 OFFICE O/P EST LOW 20 MIN: CPT | Mod: ACP

## 2023-10-19 ENCOUNTER — APPOINTMENT (OUTPATIENT)
Dept: NEUROLOGY | Facility: CLINIC | Age: 63
End: 2023-10-19
Payer: MEDICARE

## 2023-10-19 VITALS
BODY MASS INDEX: 30.56 KG/M2 | DIASTOLIC BLOOD PRESSURE: 67 MMHG | HEART RATE: 69 BPM | WEIGHT: 179 LBS | SYSTOLIC BLOOD PRESSURE: 105 MMHG | HEIGHT: 64 IN

## 2023-10-19 DIAGNOSIS — M54.9 DORSALGIA, UNSPECIFIED: ICD-10-CM

## 2023-10-19 DIAGNOSIS — R51.9 HEADACHE, UNSPECIFIED: ICD-10-CM

## 2023-10-19 PROCEDURE — 99213 OFFICE O/P EST LOW 20 MIN: CPT

## 2023-11-01 ENCOUNTER — APPOINTMENT (OUTPATIENT)
Dept: ORTHOPEDIC SURGERY | Facility: CLINIC | Age: 63
End: 2023-11-01
Payer: OTHER MISCELLANEOUS

## 2023-11-01 PROCEDURE — 99213 OFFICE O/P EST LOW 20 MIN: CPT

## 2023-11-21 ENCOUNTER — APPOINTMENT (OUTPATIENT)
Dept: ORTHOPEDIC SURGERY | Facility: CLINIC | Age: 63
End: 2023-11-21
Payer: OTHER MISCELLANEOUS

## 2023-11-21 PROCEDURE — 20610 DRAIN/INJ JOINT/BURSA W/O US: CPT | Mod: RT

## 2023-11-21 PROCEDURE — 99213 OFFICE O/P EST LOW 20 MIN: CPT | Mod: 25

## 2023-11-23 ENCOUNTER — NON-APPOINTMENT (OUTPATIENT)
Age: 63
End: 2023-11-23

## 2023-12-05 RX ORDER — ALBUTEROL SULFATE 90 UG/1
108 (90 BASE) INHALANT RESPIRATORY (INHALATION)
Qty: 1 | Refills: 3 | Status: ACTIVE | COMMUNITY
Start: 2022-05-26 | End: 1900-01-01

## 2023-12-07 ENCOUNTER — RX RENEWAL (OUTPATIENT)
Age: 63
End: 2023-12-07

## 2023-12-07 RX ORDER — ALBUTEROL SULFATE 2.5 MG/3ML
(2.5 MG/3ML) SOLUTION RESPIRATORY (INHALATION)
Qty: 150 | Refills: 3 | Status: ACTIVE | COMMUNITY
Start: 1900-01-01 | End: 1900-01-01

## 2023-12-11 ENCOUNTER — APPOINTMENT (OUTPATIENT)
Dept: PAIN MANAGEMENT | Facility: CLINIC | Age: 63
End: 2023-12-11
Payer: OTHER MISCELLANEOUS

## 2023-12-11 PROCEDURE — 99213 OFFICE O/P EST LOW 20 MIN: CPT | Mod: ACP

## 2023-12-18 NOTE — PATIENT PROFILE ADULT - NSPROMEDSPUMP_GEN_A_NUR
"Nguyen Sky" Chandana was seen and treated in our  department on 12/18/2023.  She may return to work on 12/18/2023  If you have any questions or concerns, please don't hesitate to call.      Thanks  Katty"
none

## 2023-12-20 ENCOUNTER — RX RENEWAL (OUTPATIENT)
Age: 63
End: 2023-12-20

## 2023-12-20 RX ORDER — MONTELUKAST 10 MG/1
10 TABLET, FILM COATED ORAL
Qty: 30 | Refills: 3 | Status: ACTIVE | COMMUNITY
Start: 2022-09-27 | End: 1900-01-01

## 2024-02-05 ENCOUNTER — RX RENEWAL (OUTPATIENT)
Age: 64
End: 2024-02-05

## 2024-02-12 ENCOUNTER — APPOINTMENT (OUTPATIENT)
Dept: PAIN MANAGEMENT | Facility: CLINIC | Age: 64
End: 2024-02-12
Payer: OTHER MISCELLANEOUS

## 2024-02-12 PROCEDURE — 99214 OFFICE O/P EST MOD 30 MIN: CPT | Mod: ACP

## 2024-02-12 NOTE — HISTORY OF PRESENT ILLNESS
[FreeTextEntry1] : ORIGINAL PRESENTATION: This is a 63 year old woman with a chief complaint of head, face and nose pain which started after a work-related injury that took place on 12/14/18. She continues to have other areas of chronic pain secondary to her work related injury.   TODAY: Last seen on 12/11/2023 and since then there has been no new complaints or acute changes. She continues to receive treatment for head, face and nose pain after a work-related injury that took place on 12/14/18. She reports falling and hitting her head and face against a wall in a bathroom at work. The patient reports loss of consciousness. An MRI of the brain taken 2/15/19 was normal and showed no abnormalities. She continues experience pain, as she was referred to us by Dr. Sotelo. We will continue to provide her Tramadol for symptom management, as it improves her quality of life and allows her to function and ambulate. She uses Tramadol on PRN basis, and also takes Gabapentin 300mg TID. Patient uses cane for ambulation.  Of note, patient had another work-related injury on 2/21/03 involving the left knee. She continues to receive care by Dr. Sotelo in orthopedics.   Of note, her low back is covered under her private insurance.    Last UDS:1/3/23 - Consistent and will repeat on next visit.

## 2024-02-12 NOTE — DISCUSSION/SUMMARY
[Medication Risks Reviewed] : Medication risks reviewed [de-identified] : Ms. Maloney is 63 year old woman receiving active care for her chronic pain. She has constant head, face and nose pain secondary to a work-related injury that took place on 12/14/18. She reports falling and hitting her head and face against a wall in a bathroom at work. For symptom management, we prescribe her Tramadol 50mg PRN as it improves her quality of life. We will add Diclofenac Sodium 75mg BID as well for pain relief. Overall there is at least a 30-50% reduction in pain with the prescribed analgesics. The patient denies any adverse side effects due to the medication (sleeping disturbance, constipation, sleepiness, hallucinations and/or urination problems). She will follow up with us in 8 weeks for med refill.  Of note, patient is scheduled to undergo a second left knee surgery under the care of Dr. Crowley for a worker's comp injury which took place in 2003.   It should also be noted she is treated for her lower back pain under her private insurance.   Last UDS: 1/3/23 - Consistent and will repeat on next visit.  Total clinician time spent today on the patient is 30 minutes including preparing to see the patient, obtaining and/or reviewing and confirming history, performing medically necessary and appropriate examination, counseling and educating the patient and/or family, documenting clinical information in the EHR and communicating and/or referring to other healthcare professionals.  YVES Pinto D.O.

## 2024-02-12 NOTE — PHYSICAL EXAM
[de-identified] : Back, including spine: Palpation of the thoracic/lumbar spine is as follows: bilateral lumbar paraspinal tenderness. Range of motion of the thoracic and lumbar spine is as follows: Diminished range of motion in all planes. Gait and function is as follows: antalgic gait and ambulation with cane.

## 2024-02-15 ENCOUNTER — APPOINTMENT (OUTPATIENT)
Dept: PULMONOLOGY | Facility: CLINIC | Age: 64
End: 2024-02-15
Payer: MEDICARE

## 2024-02-15 VITALS
RESPIRATION RATE: 12 BRPM | OXYGEN SATURATION: 95 % | HEART RATE: 66 BPM | BODY MASS INDEX: 31.58 KG/M2 | WEIGHT: 185 LBS | SYSTOLIC BLOOD PRESSURE: 120 MMHG | HEIGHT: 64 IN | DIASTOLIC BLOOD PRESSURE: 80 MMHG

## 2024-02-15 DIAGNOSIS — J45.909 UNSPECIFIED ASTHMA, UNCOMPLICATED: ICD-10-CM

## 2024-02-15 DIAGNOSIS — G47.33 OBSTRUCTIVE SLEEP APNEA (ADULT) (PEDIATRIC): ICD-10-CM

## 2024-02-15 PROCEDURE — 99213 OFFICE O/P EST LOW 20 MIN: CPT

## 2024-02-15 NOTE — ASSESSMENT
[FreeTextEntry1] : Moderate LUIS on APAP.   Cough variant Asthma acting like persistent not very well compensated secondary to compliance. Patient stopped her Symbicort.  HO GERD improved on therapy and diet.

## 2024-03-11 ENCOUNTER — APPOINTMENT (OUTPATIENT)
Dept: ORTHOPEDIC SURGERY | Facility: CLINIC | Age: 64
End: 2024-03-11
Payer: MEDICARE

## 2024-03-11 DIAGNOSIS — T84.84XD PAIN DUE TO INTERNAL ORTHOPEDIC PROSTHETIC DEVICES, IMPLANTS AND GRAFTS, SUBSEQUENT ENCOUNTER: ICD-10-CM

## 2024-03-11 DIAGNOSIS — M17.12 UNILATERAL PRIMARY OSTEOARTHRITIS, LEFT KNEE: ICD-10-CM

## 2024-03-11 DIAGNOSIS — Z96.652 PAIN DUE TO INTERNAL ORTHOPEDIC PROSTHETIC DEVICES, IMPLANTS AND GRAFTS, SUBSEQUENT ENCOUNTER: ICD-10-CM

## 2024-03-11 PROCEDURE — 99213 OFFICE O/P EST LOW 20 MIN: CPT

## 2024-03-11 NOTE — REASON FOR VISIT
[FreeTextEntry2] : Patient is coming in today for a follow up on cervical spine,. Lumbar spine and left knee still taking tramadol and gabapentin seeing Dr. Mccracken tomorrow also sees rheumatology remains on hydroxychloroquineStill often uses a cane

## 2024-03-11 NOTE — IMAGING
[de-identified] :  usually  ambulates with a cane   cervical exam limited motion in rotation flexion extension some spasm head position is normal trapezius spasm bilaterally reflexes upper extremity brisk and symmetric lumbar exam diffuse spasm limited motion in flexion extension tightness in dorsal lumbar fascia and hamstrings negative straight leg bilaterally reflexes brisk and symmetric Left knee some stiffness healed incision no swelling  Right knee crepitus mild swelling mild pain medially

## 2024-03-11 NOTE — ASSESSMENT
[FreeTextEntry1] : no orthopedic intervention planned at this time light exercise is to be encouraged  tramadol and gabapentin as per pain management she remains totally disabled unable to work I will see her back in 4 months does have a follow-up with F .

## 2024-03-11 NOTE — HISTORY OF PRESENT ILLNESS
[de-identified] : Patient Complaint - Neck and back pain\par  she is currently 89 still using Tylenol/ gabapentin  tramadol and diclofenac she had lumbar surgery 05/27/2021 at [facility] several complications with\par  2 CSF leaks today still struggling with stiffness in the left knee replacement has seen Dr. Goyal    consideration of\par  surgery for the knee is on hold   having  difficulty or bladder and bowel function had a cataract surgery in\par  both eyes using a TENS unit for some of the knee pain

## 2024-03-12 ENCOUNTER — APPOINTMENT (OUTPATIENT)
Dept: ORTHOPEDIC SURGERY | Facility: CLINIC | Age: 64
End: 2024-03-12
Payer: OTHER MISCELLANEOUS

## 2024-03-12 DIAGNOSIS — M17.11 UNILATERAL PRIMARY OSTEOARTHRITIS, RIGHT KNEE: ICD-10-CM

## 2024-03-12 PROCEDURE — 20610 DRAIN/INJ JOINT/BURSA W/O US: CPT | Mod: RT

## 2024-03-12 PROCEDURE — 99213 OFFICE O/P EST LOW 20 MIN: CPT | Mod: 25

## 2024-03-12 NOTE — HISTORY OF PRESENT ILLNESS
[de-identified] : Right knee pain left knee replacment doing well not quite ready  for a rt tka wishes to repeat inj  Reports right knee pain not well controlled with medication and therapeutic modalities alone. Notes difficulty with ADL's secondary to knee pain. Pain related to activity but only partially improved with rest, medications, modalities.  Past Medical History is unchanged, all medical issues and medications are stable.  Review of systems is negative for fevers, chills, chest pain, shortness of breath, unexplained weight fluctuations, GI or  symptoms.  Smoking history and social habits are unchanged.  Right Knee: JLT noted, guarding with ROM, decreased quad extension strength, no contracture, no gross instability.   Imaging: Prior imaging on chart reviewed  Impression is right knee pain likely do to degenerative changes and internal derangement.  Plan for right knee injection  Prior to injection, risks and benefits of the procedure were discussed, including but not limited to pain, swelling, failure to improve symptoms and in rare cases infection or allergic reaction.  The right knee was prepped and draped with betadine and alcohol.  Using sterile technique 1cc of 40mg/cc kenalog solution mixed with 4cc of 1% lidocaine was injected thru an anterolateral portal using a 22guage needle.  Upon withdrawing the needle pressure was applied with to the injection site for approximately 1 minute.  Once hemostasis was achieved a band-aid dressing was applied.  The patient tolerated the procedure well.  followup in 4-6 months or as needed.

## 2024-04-08 ENCOUNTER — APPOINTMENT (OUTPATIENT)
Dept: PAIN MANAGEMENT | Facility: CLINIC | Age: 64
End: 2024-04-08
Payer: OTHER MISCELLANEOUS

## 2024-04-08 PROCEDURE — 99214 OFFICE O/P EST MOD 30 MIN: CPT | Mod: ACP

## 2024-04-08 RX ORDER — DICLOFENAC POTASSIUM 50 MG/1
50 TABLET, COATED ORAL TWICE DAILY
Qty: 60 | Refills: 4 | Status: DISCONTINUED | COMMUNITY
Start: 2023-01-24 | End: 2024-04-08

## 2024-04-08 NOTE — DISCUSSION/SUMMARY
[Medication Risks Reviewed] : Medication risks reviewed [de-identified] : Ms. Maloney is 63 year old woman receiving active care for her chronic pain. She has constant head, face and nose pain secondary to a work-related injury that took place on 12/14/18. She reports falling and hitting her head and face against a wall in a bathroom at work. For symptom management, we prescribe her Tramadol 50mg PRN as it improves her quality of life. We will continue Gabapentin and  Diclofenac Sodium 75mg BID as well for pain relief. Overall there is at least a 30-50% reduction in pain with the prescribed analgesics. The patient denies any adverse side effects due to the medication (sleeping disturbance, constipation, sleepiness, hallucinations and/or urination problems). She will follow up with us in 8 weeks for med refill.  Last UDS: 1/3/23 - Consistent and will repeat on next visit.  Total clinician time spent today on the patient is 30 minutes including preparing to see the patient, obtaining and/or reviewing and confirming history, performing medically necessary and appropriate examination, counseling and educating the patient and/or family, documenting clinical information in the EHR and communicating and/or referring to other healthcare professionals.  YVES Pinto D.O.

## 2024-04-08 NOTE — HISTORY OF PRESENT ILLNESS
[FreeTextEntry1] : ORIGINAL PRESENTATION: This is a 63 year old woman with a chief complaint of head, face and nose pain which started after a work-related injury that took place on 12/14/18. She continues to have other areas of chronic pain secondary to her work related injury.   TODAY: Last seen on 02/12/2024 and since then there has been no new complaints or acute changes. She continues to receive treatment for head, face and nose pain after a work-related injury that took place on 12/14/18. She reports falling and hitting her head and face against a wall in a bathroom at work. The patient reports loss of consciousness. An MRI of the brain taken 2/15/19 was normal and showed no abnormalities. She continues experience pain, as she was referred to us by Dr. Sotelo. We will continue to provide her Tramadol for symptom management, as it improves her quality of life and allows her to function and ambulate. She uses Tramadol on PRN basis, and also takes Gabapentin 300mg TID. Patient uses cane for ambulation.  Of note, patient had another work-related injury on 2/21/03 involving the left knee. She continues to receive care by Dr. Sotelo in orthopedics.   Of note, her low back is covered under her private insurance.    Last UDS:1/3/23 - Consistent and will repeat on next visit.

## 2024-04-08 NOTE — PHYSICAL EXAM
[de-identified] : Back, including spine: Palpation of the thoracic/lumbar spine is as follows: bilateral lumbar paraspinal tenderness. Range of motion of the thoracic and lumbar spine is as follows: Diminished range of motion in all planes. Gait and function is as follows: antalgic gait and ambulation with cane.

## 2024-04-22 ENCOUNTER — APPOINTMENT (OUTPATIENT)
Dept: OPHTHALMOLOGY | Facility: CLINIC | Age: 64
End: 2024-04-22
Payer: MEDICARE

## 2024-04-22 ENCOUNTER — NON-APPOINTMENT (OUTPATIENT)
Age: 64
End: 2024-04-22

## 2024-04-22 PROCEDURE — 68761 CLOSE TEAR DUCT OPENING: CPT | Mod: E2,E4

## 2024-04-22 PROCEDURE — 92014 COMPRE OPH EXAM EST PT 1/>: CPT | Mod: 25

## 2024-04-22 PROCEDURE — 92250 FUNDUS PHOTOGRAPHY W/I&R: CPT

## 2024-06-03 ENCOUNTER — APPOINTMENT (OUTPATIENT)
Dept: PAIN MANAGEMENT | Facility: CLINIC | Age: 64
End: 2024-06-03
Payer: OTHER MISCELLANEOUS

## 2024-06-03 DIAGNOSIS — M51.9 UNSPECIFIED THORACIC, THORACOLUMBAR AND LUMBOSACRAL INTERVERTEBRAL DISC DISORDER: ICD-10-CM

## 2024-06-03 DIAGNOSIS — G89.29 OTHER CHRONIC PAIN: ICD-10-CM

## 2024-06-03 DIAGNOSIS — M54.16 RADICULOPATHY, LUMBAR REGION: ICD-10-CM

## 2024-06-03 PROCEDURE — 99214 OFFICE O/P EST MOD 30 MIN: CPT | Mod: ACP

## 2024-06-03 RX ORDER — DICLOFENAC SODIUM 75 MG/1
75 TABLET, DELAYED RELEASE ORAL
Qty: 60 | Refills: 1 | Status: ACTIVE | COMMUNITY
Start: 2023-12-11 | End: 1900-01-01

## 2024-06-03 RX ORDER — TRAMADOL HYDROCHLORIDE 50 MG/1
50 TABLET, COATED ORAL TWICE DAILY
Qty: 60 | Refills: 1 | Status: ACTIVE | COMMUNITY
Start: 2022-11-01 | End: 1900-01-01

## 2024-06-03 RX ORDER — GABAPENTIN 300 MG/1
300 CAPSULE ORAL
Qty: 90 | Refills: 3 | Status: ACTIVE | COMMUNITY
Start: 2021-06-07 | End: 1900-01-01

## 2024-06-03 RX ORDER — TIRZEPATIDE 5 MG/.5ML
5 INJECTION, SOLUTION SUBCUTANEOUS
Refills: 0 | Status: ACTIVE | COMMUNITY

## 2024-06-03 NOTE — DISCUSSION/SUMMARY
[Medication Risks Reviewed] : Medication risks reviewed [de-identified] : Ms. Maloney is 64 year old woman receiving active care for her chronic pain. She has constant head, face and nose pain secondary to a work-related injury that took place on 12/14/18. She reports falling and hitting her head and face against a wall in a bathroom at work. For symptom management, we prescribe her Tramadol 50mg PRN as it improves her quality of life. We will continue Gabapentin and  Diclofenac Sodium 75mg BID as well for pain relief. Overall there is at least a 30-50% reduction in pain with the prescribed analgesics. The patient denies any adverse side effects due to the medication (sleeping disturbance, constipation, sleepiness, hallucinations and/or urination problems). She will follow up with us in 8 weeks for med refill.  Last UDS: 1/3/23 - Consistent and will repeat on next visit.  Total clinician time spent today on the patient is 30 minutes including preparing to see the patient, obtaining and/or reviewing and confirming history, performing medically necessary and appropriate examination, counseling and educating the patient and/or family, documenting clinical information in the EHR and communicating and/or referring to other healthcare professionals.  YVES Pinto MD

## 2024-06-03 NOTE — HISTORY OF PRESENT ILLNESS
[FreeTextEntry1] : ORIGINAL PRESENTATION: This is a 64 year old woman with a chief complaint of head, face and nose pain which started after a work-related injury that took place on 12/14/18. She continues to have other areas of chronic pain secondary to her work related injury.   TODAY: Last seen on 04/08/2024 and since then there has been no new complaints or acute changes. She continues to receive treatment for head, face and nose pain after a work-related injury that took place on 12/14/18. She reports falling and hitting her head and face against a wall in a bathroom at work. The patient reports loss of consciousness. An MRI of the brain taken 2/15/19 was normal and showed no abnormalities. She continues experience pain, as she was referred to us by Dr. Sotelo. We will continue to provide her Tramadol for symptom management, as it improves her quality of life and allows her to function and ambulate. She uses Tramadol on PRN basis, and also takes Gabapentin 300mg TID. Patient uses cane for ambulation.  Of note, patient had another work-related injury on 2/21/03 involving the left knee. She continues to receive care by Dr. Sotelo in orthopedics.   Of note, her low back is covered under her private insurance.    Last UDS:1/3/23 - Consistent and will repeat on next visit.

## 2024-06-03 NOTE — PHYSICAL EXAM
[de-identified] : Back, including spine: Palpation of the thoracic/lumbar spine is as follows: bilateral lumbar paraspinal tenderness. Range of motion of the thoracic and lumbar spine is as follows: Diminished range of motion in all planes. Gait and function is as follows: antalgic gait and ambulation with cane.

## 2024-06-17 ENCOUNTER — APPOINTMENT (OUTPATIENT)
Dept: UROGYNECOLOGY | Facility: CLINIC | Age: 64
End: 2024-06-17
Payer: MEDICARE

## 2024-06-17 VITALS
WEIGHT: 185 LBS | SYSTOLIC BLOOD PRESSURE: 108 MMHG | BODY MASS INDEX: 31.58 KG/M2 | DIASTOLIC BLOOD PRESSURE: 71 MMHG | HEART RATE: 72 BPM | HEIGHT: 64 IN

## 2024-06-17 DIAGNOSIS — R33.9 RETENTION OF URINE, UNSPECIFIED: ICD-10-CM

## 2024-06-17 DIAGNOSIS — R10.2 PELVIC AND PERINEAL PAIN: ICD-10-CM

## 2024-06-17 DIAGNOSIS — M62.89 OTHER SPECIFIED DISORDERS OF MUSCLE: ICD-10-CM

## 2024-06-17 PROCEDURE — 99459 PELVIC EXAMINATION: CPT

## 2024-06-17 PROCEDURE — 51701 INSERT BLADDER CATHETER: CPT

## 2024-06-17 PROCEDURE — 99204 OFFICE O/P NEW MOD 45 MIN: CPT | Mod: 25

## 2024-06-17 RX ORDER — TAMSULOSIN HYDROCHLORIDE 0.4 MG/1
0.4 CAPSULE ORAL
Qty: 30 | Refills: 5 | Status: ACTIVE | COMMUNITY
Start: 2022-03-22 | End: 1900-01-01

## 2024-06-17 RX ORDER — CYCLOBENZAPRINE HYDROCHLORIDE 5 MG/1
5 TABLET, FILM COATED ORAL
Qty: 30 | Refills: 1 | Status: ACTIVE | COMMUNITY
Start: 2021-05-06 | End: 1900-01-01

## 2024-06-17 RX ORDER — FLUCONAZOLE 150 MG/1
150 TABLET ORAL
Qty: 2 | Refills: 0 | Status: COMPLETED | COMMUNITY
Start: 2024-03-22 | End: 2024-06-17

## 2024-06-18 NOTE — END OF VISIT
[TextEntry] :  I, Krista Monahan PA-C, spent 30 minutes face to face with the patient. This excludes cath

## 2024-06-18 NOTE — HISTORY OF PRESENT ILLNESS
[FreeTextEntry1] : Patient is here for 6 months med check for incomplete bladder emptying and muscle hypertonicity. Last seen on 1/11/2023 for med check/PVR check.  History of incomplete bladder emptying and overflow incontinence No prolapse on exam Significant constipation- was using miralax and now using prunes also. Hx of DM (a1c 5.4%) h/o spinal surgery x2 in the Spring (complicated by a spinal fluid leak), + incontinence since the surgeries  Underwent urodynamics 12/27/21: Impression: incomplete bladder emptying that is likely secondary to underactive bladder, pelvic muscle hypertonicity, urethral hypertonicity Plan: renal imaging, muscle relaxant +/- flomax, if no improvement then recommend intermittent self cathing  Flexeril 5mg BID, still elevated PVR  Flomax 0.4mg QHS and Flexeril 10 mg BID,  cc Unable to self cath continue medications and renal surveillance  estrace  1/13/2023 renal ultrasound: no hydronephrosis bilaterally 7/31/2023 renal ultrasound: no hydronephrosis bilaterally  Today, patient states she is happy with Flexeril 10 mg twice a day, and Flomax at bedtime and is noticing improvement. She feels that she is emptying her bladder better. Denies side effects from the medications. Patient does not feel she has an infection.  Patient would like to continue Flexeril 10 mg twice a day and Flomax at bedtime.  Notes that for the past few days, she has been experiencing severe right sided pelvic pain. She feels that this pain is near her ovaries. Has not seen her gyn since last year at her annual. Notes that when she has the pain, it is stabbing in nature, 9/10 in severity. Pain is relieved after taking Flexeril. Does not have the pain currently.  Would like urine to be checked for infection.

## 2024-06-18 NOTE — PHYSICAL EXAM
[Chaperone Present] : A chaperone was present in the examining room during all aspects of the physical examination [35958] : A chaperone was present during the pelvic exam. [No Acute Distress] : in no acute distress [Well developed] : well developed [Well Nourished] : ~L well nourished [FreeTextEntry2] : Yoko CHAPPELL  [FreeTextEntry1] : Indication: Incomplete bladder emptying Urethra was prepped in sterile fashion and then a sterile non- indwelling catheter (14F) was used by me to drain the bladder. The patient tolerated the procedure well. void: 100 cc PVR: 140 cc  Abdomen: soft, nontender, nondistended Pelvic exam: +minimal right sided tenderness on bimanual exam, no masses palpated

## 2024-06-18 NOTE — REASON FOR VISIT
[TextEntry] : Reason for visit: 6 Month Medication Check Voids per day: 3-4   Voids per night: 3-4 Urge incontinence: Rarely Stress incontinence: No Constipation: No Fecal incontinence: No Vaginal bulge: No

## 2024-06-18 NOTE — DISCUSSION/SUMMARY
[FreeTextEntry1] : Pelvic pain:  Right sided pelvic pain on bimanual exam TVUS ordered for further evaluation F/u with gyn  Precautions reviewed Patient goes to [facility] gyn on [address] and will go there this afternoon, will go to ED if pain is severe  Incomplete Bladder emptying- Urine culture obtained. Will follow up. Will treat accordingly if necessary Discussed continuing current management (Flexeril 10 mg BID, Flomax and renal surveillance) or adjusting medications. Patient agrees to see if medication adjustment will help her empty her bladder better.  Will increase flexeril to flexeril 10 mg am and flexeril 15 mg pm, precautions reviewed Continue flomax 0.4 mg QHS Renal sono ordered (last sono 7/23) Will return for med check/PVR check 6-8 weeks  Muscle hypertonicity- Continue flomax Increase flexeril to flexeril 10 mg am and flexeril 15 mg pm, precautions reviewed Will return for med check/PVR check 6-8 weeks

## 2024-06-18 NOTE — COUNSELING
[FreeTextEntry1] : If you feel like you have an infection it is important for you to call our office and we will arrange testing of your urine.  Please continue taking Flomax at bedtime. Refills sent to your pharmacy.  Please continue taking Flexeril 10 mg in the morning.   Please STOP taking Flexeril 10 mg at bedtime and please begin taking Flexeril 15 mg at bedtime. It can make you sleepy/ It takes up to 6 weeks to go into full effect. Please  your refill when you complete the 1st bottle.  Please continue to apply a pea size amount to the opening of the vagina three times a week.   Please call my office if you have any issues with the cost or side effects of the medication.   Schedule a 6-8 week follow up med check/PVR check appointment.  Please schedule pelvic sonogram and see your gynecologist for the pain that you are experiencing.   We will contact you if the urine results are abnormal.  Please schedule the kidney sonogram

## 2024-06-19 LAB — URINE CULTURE <10: NORMAL

## 2024-07-08 ENCOUNTER — APPOINTMENT (OUTPATIENT)
Dept: ORTHOPEDIC SURGERY | Facility: CLINIC | Age: 64
End: 2024-07-08

## 2024-07-12 ENCOUNTER — TRANSCRIPTION ENCOUNTER (OUTPATIENT)
Age: 64
End: 2024-07-12

## 2024-07-16 ENCOUNTER — APPOINTMENT (OUTPATIENT)
Dept: ORTHOPEDIC SURGERY | Facility: CLINIC | Age: 64
End: 2024-07-16
Payer: OTHER MISCELLANEOUS

## 2024-07-16 DIAGNOSIS — M17.11 UNILATERAL PRIMARY OSTEOARTHRITIS, RIGHT KNEE: ICD-10-CM

## 2024-07-16 PROCEDURE — 99213 OFFICE O/P EST LOW 20 MIN: CPT | Mod: 25

## 2024-07-16 PROCEDURE — 20610 DRAIN/INJ JOINT/BURSA W/O US: CPT | Mod: RT

## 2024-07-31 ENCOUNTER — APPOINTMENT (OUTPATIENT)
Dept: UROGYNECOLOGY | Facility: CLINIC | Age: 64
End: 2024-07-31
Payer: MEDICARE

## 2024-07-31 VITALS
SYSTOLIC BLOOD PRESSURE: 91 MMHG | DIASTOLIC BLOOD PRESSURE: 58 MMHG | HEIGHT: 64 IN | BODY MASS INDEX: 31.24 KG/M2 | HEART RATE: 66 BPM | WEIGHT: 183 LBS

## 2024-07-31 VITALS — DIASTOLIC BLOOD PRESSURE: 75 MMHG | SYSTOLIC BLOOD PRESSURE: 117 MMHG

## 2024-07-31 DIAGNOSIS — M62.89 OTHER SPECIFIED DISORDERS OF MUSCLE: ICD-10-CM

## 2024-07-31 DIAGNOSIS — N39.0 URINARY TRACT INFECTION, SITE NOT SPECIFIED: ICD-10-CM

## 2024-07-31 DIAGNOSIS — R33.9 RETENTION OF URINE, UNSPECIFIED: ICD-10-CM

## 2024-07-31 DIAGNOSIS — N95.2 POSTMENOPAUSAL ATROPHIC VAGINITIS: ICD-10-CM

## 2024-07-31 PROCEDURE — 51701 INSERT BLADDER CATHETER: CPT

## 2024-07-31 PROCEDURE — 99459 PELVIC EXAMINATION: CPT

## 2024-07-31 PROCEDURE — 99214 OFFICE O/P EST MOD 30 MIN: CPT | Mod: 25

## 2024-07-31 RX ORDER — ESTRADIOL 0.1 MG/G
0.1 CREAM VAGINAL
Qty: 42.5 | Refills: 1 | Status: ACTIVE | COMMUNITY
Start: 2024-07-31 | End: 1900-01-01

## 2024-07-31 RX ORDER — CYCLOBENZAPRINE HYDROCHLORIDE 10 MG/1
10 TABLET, FILM COATED ORAL
Qty: 60 | Refills: 5 | Status: ACTIVE | COMMUNITY
Start: 2024-07-31 | End: 1900-01-01

## 2024-07-31 NOTE — DISCUSSION/SUMMARY
[FreeTextEntry1] : Incomplete bladder emptying PVR: 90cc, normal Continue Flomax 0.4mg QHS Precautions discussed with patient Follow up 6 months  Muscles Hypertonicity Continue Flexeril 10mg QAM, and Flexeril 15mg QHS, refills sent, 30 adys, 5 refills Precautions reviewed  Atrophic Vagininits Restart estrace cream 3x a weem, refill sent  Recurrent UTIs Urine culture obtained. Will follow up. Will treat accordingly if necessary

## 2024-07-31 NOTE — PHYSICAL EXAM
[Chaperone Present] : A chaperone was present in the examining room during all aspects of the physical examination [07229] : A chaperone was present during the pelvic exam. [No Acute Distress] : in no acute distress [Well developed] : well developed [Well Nourished] : ~L well nourished [FreeTextEntry2] : Ying ESCOBAR [FreeTextEntry1] : Indication: GEORGE Urethra was prepped in sterile fashion and then a sterile non indwelling catheter (14F) was used by me to drain the bladder. The patient tolerated the procedure well.  void: 0 PVR: 90cc (cloudy)

## 2024-07-31 NOTE — HISTORY OF PRESENT ILLNESS
[FreeTextEntry1] : Patient is here for 6 weeks med check for incomplete bladder emptying. Last seen on 6/17/24 for med check/PVR check.    History of incomplete bladder emptying and overflow incontinence No prolapse on exam Significant constipation- was using miralax and now using prunes also. Hx of DM (a1c 5.4%) h/o spinal surgery x2 in the Spring (complicated by a spinal fluid leak), + incontinence since the surgeries  Underwent urodynamics 12/27/21: Impression: incomplete bladder emptying that is likely secondary to underactive bladder, pelvic muscle hypertonicity, urethral hypertonicity Plan: renal imaging, muscle relaxant +/- flomax, if no improvement then recommend intermittent self cathing  Flexeril 5mg BID, still elevated PVR  Flomax 0.4mg QHS  s/p Flexeril 10 mg BID,  cc Unable to self cath continue medications and renal surveillance  estrace  1/13/2023 renal ultrasound: no hydronephrosis bilaterally 7/31/2023 renal ultrasound: no hydronephrosis bilaterally  6/25/24 renal sono: normal 6/25/24, pelvic sono: normal (2 tiny fibroids)    Flexeril 10 mg QAM, 15mg QHS   Today, patient states she is feeling better with Flexeril 10mg QAM and Flexeril 15mg QHS and Flomax QHS and feels that she is emptying the bladder fully most of the time. Not using estrace cream. Denies side effects. Patient does not feel she has an infection.    
[FreeTextEntry2] : right shoulder

## 2024-07-31 NOTE — COUNSELING
[FreeTextEntry1] : If you feel like you have an infection it is important for you to call our office and we will arrange testing of your urine.  Please continue taking Flomax at bedtime.   Please continue taking Flexeril 10 mg in the morning and Flexeril 15mg at bedtime.  Please Restart to apply a pea size amount to the opening of the vagina three times a week.  We will contact you if the urine results are abnormal.  Please follow up in 6 months with MOHSEN Grimm.

## 2024-07-31 NOTE — REASON FOR VISIT
[TextEntry] : Reason for visit: Follow Up - Med Check/PVR Check Voids per day:  4-5  Voids per night:   3 Urge incontinence: Occasional (+) urgency (+) leakage Stress incontinence: No Constipation: No    Fecal incontinence: No    Vaginal bulge: No

## 2024-08-01 ENCOUNTER — APPOINTMENT (OUTPATIENT)
Dept: ORTHOPEDIC SURGERY | Facility: CLINIC | Age: 64
End: 2024-08-01
Payer: OTHER MISCELLANEOUS

## 2024-08-01 DIAGNOSIS — M17.12 UNILATERAL PRIMARY OSTEOARTHRITIS, LEFT KNEE: ICD-10-CM

## 2024-08-01 DIAGNOSIS — T84.84XD PAIN DUE TO INTERNAL ORTHOPEDIC PROSTHETIC DEVICES, IMPLANTS AND GRAFTS, SUBSEQUENT ENCOUNTER: ICD-10-CM

## 2024-08-01 DIAGNOSIS — M51.9 UNSPECIFIED THORACIC, THORACOLUMBAR AND LUMBOSACRAL INTERVERTEBRAL DISC DISORDER: ICD-10-CM

## 2024-08-01 DIAGNOSIS — M54.16 RADICULOPATHY, LUMBAR REGION: ICD-10-CM

## 2024-08-01 DIAGNOSIS — Z96.652 PAIN DUE TO INTERNAL ORTHOPEDIC PROSTHETIC DEVICES, IMPLANTS AND GRAFTS, SUBSEQUENT ENCOUNTER: ICD-10-CM

## 2024-08-01 PROCEDURE — 99213 OFFICE O/P EST LOW 20 MIN: CPT

## 2024-08-01 NOTE — IMAGING
[de-identified] :  usually  ambulates with a cane   cervical exam limited motion in rotation flexion extension some spasm head position is normal trapezius spasm bilaterally reflexes upper extremity brisk and symmetric lumbar exam diffuse spasm limited motion in flexion extension tightness in dorsal lumbar fascia and hamstrings negative straight leg bilaterally reflexes brisk and symmetric Left knee some stiffness healed incision no swelling  Right knee crepitus mild swelling mild pain medially

## 2024-08-01 NOTE — REASON FOR VISIT
[FreeTextEntry2] : Patient is coming in today for a follow up on cervical spine,. Lumbar spine and left knee still getting shots in the right knee which helped with cortisone not ready to consider replacement pain management is planning RFA procedures in the left knee geniculate nerve feels about the same tries to exercise weight is elevated but stable does use a cane

## 2024-08-01 NOTE — IMAGING
[de-identified] :  usually  ambulates with a cane   cervical exam limited motion in rotation flexion extension some spasm head position is normal trapezius spasm bilaterally reflexes upper extremity brisk and symmetric lumbar exam diffuse spasm limited motion in flexion extension tightness in dorsal lumbar fascia and hamstrings negative straight leg bilaterally reflexes brisk and symmetric Left knee some stiffness healed incision no swelling  Right knee crepitus mild swelling mild pain medially

## 2024-08-01 NOTE — HISTORY OF PRESENT ILLNESS
[de-identified] : Patient Complaint - Neck and back pain\par  she is currently 89 still using Tylenol/ gabapentin  tramadol and diclofenac she had lumbar surgery 05/27/2021 at [facility] several complications with\par  2 CSF leaks today still struggling with stiffness in the left knee replacement has seen Dr. Goyal    consideration of\par  surgery for the knee is on hold   having  difficulty or bladder and bowel function had a cataract surgery in\par  both eyes using a TENS unit for some of the knee pain

## 2024-08-01 NOTE — HISTORY OF PRESENT ILLNESS
[de-identified] : Patient Complaint - Neck and back pain\par  she is currently 89 still using Tylenol/ gabapentin  tramadol and diclofenac she had lumbar surgery 05/27/2021 at [facility] several complications with\par  2 CSF leaks today still struggling with stiffness in the left knee replacement has seen Dr. Goyal    consideration of\par  surgery for the knee is on hold   having  difficulty or bladder and bowel function had a cataract surgery in\par  both eyes using a TENS unit for some of the knee pain

## 2024-08-01 NOTE — ASSESSMENT
[FreeTextEntry1] : no orthopedic intervention planned at this time light exercise is encouraged  tramadol and gabapentin as per pain management  she remains totally disabled unable to work I will see her back in 4 months did have a follow-up with [facility] .

## 2024-08-02 ENCOUNTER — RX RENEWAL (OUTPATIENT)
Age: 64
End: 2024-08-02

## 2024-08-05 LAB — URINE CULTURE <10: NORMAL

## 2024-08-12 ENCOUNTER — LABORATORY RESULT (OUTPATIENT)
Age: 64
End: 2024-08-12

## 2024-08-12 ENCOUNTER — APPOINTMENT (OUTPATIENT)
Dept: PAIN MANAGEMENT | Facility: CLINIC | Age: 64
End: 2024-08-12
Payer: OTHER MISCELLANEOUS

## 2024-08-12 DIAGNOSIS — M17.12 UNILATERAL PRIMARY OSTEOARTHRITIS, LEFT KNEE: ICD-10-CM

## 2024-08-12 DIAGNOSIS — M17.11 UNILATERAL PRIMARY OSTEOARTHRITIS, RIGHT KNEE: ICD-10-CM

## 2024-08-12 PROCEDURE — 99214 OFFICE O/P EST MOD 30 MIN: CPT | Mod: ACP

## 2024-08-12 NOTE — HISTORY OF PRESENT ILLNESS
[FreeTextEntry1] : ORIGINAL PRESENTATION: This is a 64 year old woman with a chief complaint of head, face and nose pain which started after a work-related injury that took place on 12/14/18. She continues to have other areas of chronic pain secondary to her work related injury.   TODAY 08-: Last seen on 06/03/2024 and since then there has been no new complaints or acute changes. She continues to receive treatment for head, face and nose pain after a work-related injury that took place on 12/14/18. She reports falling and hitting her head and face against a wall in a bathroom at work. The patient reports loss of consciousness. An MRI of the brain taken 2/15/19 was normal and showed no abnormalities. She continues experience pain, as she was referred to us by Dr. Sotelo. We will continue to provide her Tramadol for symptom management, as it improves her quality of life and allows her to function and ambulate. She uses Tramadol on PRN basis, and also takes Gabapentin 300mg TID. Patient uses cane for ambulation.  Of note, patient had another work-related injury on 2/21/03 involving the left knee. She continues to receive care by Dr. Sotelo in orthopedics. She continues with left dull, achy knee pain worse with standing and walking, better with sitting without associated numbness, tingling or radicular pain. Pain is 8/10 in intensity. She has tried NSAIDs, PT/HEP without significant relief. Patient denies locking, clicking, instability, falls.  Of note, her low back is covered under her private insurance.    Last UDS:1/3/23 - Consistent and will repeat on next visit.

## 2024-08-12 NOTE — DISCUSSION/SUMMARY
[Medication Risks Reviewed] : Medication risks reviewed [de-identified] : Ms. Maloney is 64-year-old woman receiving active care for her chronic pain. She has constant head, face and nose pain secondary to a work-related injury that took place on 12/14/18. She reports falling and hitting her head and face against a wall in a bathroom at work. For symptom management, we prescribe her Tramadol 50mg PRN as it improves her quality of life. We will continue Gabapentin and Diclofenac Sodium 75mg BID as well for pain relief. Overall, there is at least a 30-50% reduction in pain with the prescribed analgesics. The patient denies any adverse side effects due to the medication (sleeping disturbance, constipation, sleepiness, hallucinations and/or urination problems). She will follow up with us in 8 weeks for med refill.  I am planning a BL superior medial genicular, superior-lateral genicular and inferior medial geniculate nerve injection under fluoroscopy guidance to diagnosis knee pain, knee osteoarthritis and pain from knee replacement to decide on further treatment and possibly RFA.  Risk, benefits, pros and cons of procedure were explained to the patient using models and diagrams and their questions were answered.   The patient has severe anxiety of procedures that necessitates monitored anesthesia care (MAC). The procedure performed will be close to major nerves, arteries, and spinal cord and/or joint structures. Due to the proximity of these structures, we need the patient to be still during the procedure.  With the help of MAC, this will be safely achieved and decrease the risk of any complications.  Last UDS: 1/3/23 - Consistent and will repeat today.  Follow up 1-2 weeks post injection for assessment of efficacy and further recommendations.  I, Cm Sarmiento, attest that this documentation has been prepared under the direction and in the presence of Provider Maxim Rice, DO The documentation recorded by the scribe, in my presence, accurately reflects the service I personally performed, and the decisions made by me with my edits as appropriate.  Best Regards, Maxim Rice D.O.

## 2024-08-20 ENCOUNTER — APPOINTMENT (OUTPATIENT)
Dept: PULMONOLOGY | Facility: CLINIC | Age: 64
End: 2024-08-20

## 2024-09-03 ENCOUNTER — APPOINTMENT (OUTPATIENT)
Dept: PULMONOLOGY | Facility: CLINIC | Age: 64
End: 2024-09-03
Payer: MEDICARE

## 2024-09-03 VITALS
WEIGHT: 180 LBS | BODY MASS INDEX: 30.73 KG/M2 | RESPIRATION RATE: 14 BRPM | OXYGEN SATURATION: 97 % | HEIGHT: 64 IN | HEART RATE: 78 BPM | SYSTOLIC BLOOD PRESSURE: 120 MMHG | DIASTOLIC BLOOD PRESSURE: 80 MMHG

## 2024-09-03 DIAGNOSIS — G47.33 OBSTRUCTIVE SLEEP APNEA (ADULT) (PEDIATRIC): ICD-10-CM

## 2024-09-03 DIAGNOSIS — J45.909 UNSPECIFIED ASTHMA, UNCOMPLICATED: ICD-10-CM

## 2024-09-03 PROCEDURE — 99214 OFFICE O/P EST MOD 30 MIN: CPT

## 2024-09-03 RX ORDER — MONTELUKAST 10 MG/1
10 TABLET, FILM COATED ORAL
Qty: 1 | Refills: 3 | Status: ACTIVE | COMMUNITY
Start: 2024-09-03 | End: 1900-01-01

## 2024-09-03 NOTE — ASSESSMENT
[FreeTextEntry1] : Moderate LUIS on APAP.   Cough variant Asthma acting well compensated at this point.  Patient stopped her Symbicort.  HO GERD improved on therapy and diet.  Now states she has remote HO lung nodule on CXR done at regional

## 2024-09-10 ENCOUNTER — APPOINTMENT (OUTPATIENT)
Dept: PAIN MANAGEMENT | Facility: CLINIC | Age: 64
End: 2024-09-10
Payer: OTHER MISCELLANEOUS

## 2024-09-10 DIAGNOSIS — M25.569 PAIN IN UNSPECIFIED KNEE: ICD-10-CM

## 2024-09-10 DIAGNOSIS — M19.90 UNSPECIFIED OSTEOARTHRITIS, UNSPECIFIED SITE: ICD-10-CM

## 2024-09-10 PROCEDURE — 99214 OFFICE O/P EST MOD 30 MIN: CPT

## 2024-09-10 NOTE — HISTORY OF PRESENT ILLNESS
[FreeTextEntry1] : ORIGINAL PRESENTATION: This is a 64 year old woman with a chief complaint of head, face and nose pain which started after a work-related injury that took place on 12/14/18. She continues to have other areas of chronic pain secondary to her work related injury.   TODAY 9/10/2024:  She continues to receive treatment for head, face and nose pain after a work-related injury that took place on 12/14/18. She reports falling and hitting her head and face against a wall in a bathroom at work. The patient reports loss of consciousness. An MRI of the brain taken 2/15/19 was normal and showed no abnormalities. She continues to have knee pain, she is being managed with tramadol, gabapentin and diclofenac.   Of note, patient had another work-related injury on 2/21/03 involving the left knee. She continues to receive care by Dr. Sotelo in orthopedics. She continues with left dull, achy knee pain worse with standing and walking, better with sitting without associated numbness, tingling or radicular pain. Pain is 8/10 in intensity. She has tried NSAIDs, PT/HEP without significant relief. Patient denies locking, clicking, instability, falls.  Of note, her low back is covered under her private insurance.

## 2024-09-10 NOTE — DISCUSSION/SUMMARY
[Medication Risks Reviewed] : Medication risks reviewed [de-identified] : Ms. Maloney is 64-year-old woman receiving active care for her chronic pain. She has constant head, face and nose pain secondary to a work-related injury that took place on 12/14/18. She reports falling and hitting her head and face against a wall in a bathroom at work. For symptom management, we prescribe her Tramadol 50mg PRN as it improves her quality of life. We will continue Gabapentin and Diclofenac Sodium 75mg BID as well for pain relief. Overall, there is at least a 30-50% reduction in pain with the prescribed analgesics. The patient denies any adverse side effects due to the medication (sleeping disturbance, constipation, sleepiness, hallucinations and/or urination problems). She will follow up with us in 8 weeks for med refill. Reference number #[id number]  She is scheduled for a  BL superior medial genicular, superior-lateral genicular and inferior medial geniculate nerve injection under fluoroscopy guidance to diagnosis knee pain, knee osteoarthritis and pain from knee replacement to decide on further treatment and possibly RFA.  Risk, benefits, pros and cons of procedure were explained to the patient using models and diagrams and their questions were answered.   The patient has severe anxiety of procedures that necessitates monitored anesthesia care (MAC). The procedure performed will be close to major nerves, arteries, and spinal cord and/or joint structures. Due to the proximity of these structures, we need the patient to be still during the procedure.  With the help of MAC, this will be safely achieved and decrease the risk of any complications.   Follow up 1-2 weeks post injection for assessment of efficacy and further recommendations.  YVES Shankar DO

## 2024-09-16 ENCOUNTER — NON-APPOINTMENT (OUTPATIENT)
Age: 64
End: 2024-09-16

## 2024-09-19 ENCOUNTER — OUTPATIENT (OUTPATIENT)
Dept: OUTPATIENT SERVICES | Facility: HOSPITAL | Age: 64
LOS: 1 days | End: 2024-09-19
Payer: MEDICARE

## 2024-09-19 ENCOUNTER — APPOINTMENT (OUTPATIENT)
Dept: PULMONOLOGY | Facility: HOSPITAL | Age: 64
End: 2024-09-19

## 2024-09-19 ENCOUNTER — APPOINTMENT (OUTPATIENT)
Facility: CLINIC | Age: 64
End: 2024-09-19

## 2024-09-19 ENCOUNTER — APPOINTMENT (OUTPATIENT)
Dept: PAIN MANAGEMENT | Facility: CLINIC | Age: 64
End: 2024-09-19

## 2024-09-19 DIAGNOSIS — Z98.890 OTHER SPECIFIED POSTPROCEDURAL STATES: Chronic | ICD-10-CM

## 2024-09-19 DIAGNOSIS — R06.02 SHORTNESS OF BREATH: ICD-10-CM

## 2024-09-19 DIAGNOSIS — Z98.51 TUBAL LIGATION STATUS: Chronic | ICD-10-CM

## 2024-09-19 DIAGNOSIS — Z90.89 ACQUIRED ABSENCE OF OTHER ORGANS: Chronic | ICD-10-CM

## 2024-09-19 DIAGNOSIS — M17.12 UNILATERAL PRIMARY OSTEOARTHRITIS, LEFT KNEE: ICD-10-CM

## 2024-09-19 DIAGNOSIS — J34.2 DEVIATED NASAL SEPTUM: Chronic | ICD-10-CM

## 2024-09-19 DIAGNOSIS — M17.11 UNILATERAL PRIMARY OSTEOARTHRITIS, RIGHT KNEE: ICD-10-CM

## 2024-09-19 PROCEDURE — 01380 ANES ALL CLSD PX KNEE JOINT: CPT | Mod: QZ,P2

## 2024-09-19 PROCEDURE — 77002 NEEDLE LOCALIZATION BY XRAY: CPT

## 2024-09-19 PROCEDURE — 94729 DIFFUSING CAPACITY: CPT

## 2024-09-19 PROCEDURE — 94070 EVALUATION OF WHEEZING: CPT

## 2024-09-19 PROCEDURE — 94664 DEMO&/EVAL PT USE INHALER: CPT

## 2024-09-19 PROCEDURE — 94729 DIFFUSING CAPACITY: CPT | Mod: 26

## 2024-09-19 PROCEDURE — 94727 GAS DIL/WSHOT DETER LNG VOL: CPT | Mod: 26

## 2024-09-19 PROCEDURE — 64450 NJX AA&/STRD OTHER PN/BRANCH: CPT | Mod: 50

## 2024-09-19 PROCEDURE — 94727 GAS DIL/WSHOT DETER LNG VOL: CPT

## 2024-09-19 PROCEDURE — 94060 EVALUATION OF WHEEZING: CPT | Mod: 26

## 2024-09-19 NOTE — PROCEDURE
[FreeTextEntry3] : Date of Service: 09/19/2024   Account: [id number]  Patient: MANJU KIDD   YOB: 1960  Age: 64 year  Surgeon:  Maxim Rice DO  Assistant: None  Pre-Operative Diagnosis:  1) Chronic bilateral knee pain 2) bilateral knee osteoarthritis  Post Operative Diagnosis: 1) Chronic b/l knee pain 2) b/l knee osteoarthritis  Procedure: 1) bilateral superior medial genicular nerve block with fluoroscopic guidance 2) bilateral superior lateral genicular nerve block with fluoroscopic guidance 3) bilateral inferomedial genicular nerve block with fluoroscopic guidance   Anesthesia:  MAC  This procedure was carried out using fluoroscopic guidance.  The risks and benefits of the procedure were discussed extensively with the patient.  The consent of the patient was obtained and the following procedure was performed.  The patient was placed in the supine position on the fluoroscopy table with the left and right knee flexed to approximately 60 degrees and the area was prepped and draped in a sterile fashion.  A timeout was performed with all essential staff present and the site and side were verified.    Then a 3.5 inch 25 gauge spinal needle was advanced under fluoroscopic guidance to medial flare of the femur metaphysis/diaphysis junction on the left  side. The needle position was confirmed in A/P and lateral fluoroscopic views.  Then 1 ml of 2% lidocaine mixd with 10mg dexamethasone was injected after negative aspiration at the left superior medial genicular branch.   This was repeated in the exact same fashion at the left superior lateral genicular branch located at the lateral flare of the femur metaphysis/diaphysis junction and inferior medial genicular branch located at the medial flare of tibial metaphysis/diaphysis junction.  This was repeated on the contralateral side.   The needles were subsequently removed.  Vital signs remained normal.  Pulse oximeter was used throughout the procedure and the patient's pulse and oxygen saturation remained within normal limits.  The patient tolerated the procedure well.  There were no complications.  The patient was instructed to apply ice over the injection sites for twenty minutes every two hours for the next 24 to 48 hours.   Disposition: 1. The patient was advised to F/U in 1-2 weeks to assess the response to the injection. 2. The patient was also instructed to contact me immediately if there were any concerns related to the procedure performed.   Maxim Rice DO

## 2024-09-20 DIAGNOSIS — R06.02 SHORTNESS OF BREATH: ICD-10-CM

## 2024-10-04 ENCOUNTER — APPOINTMENT (OUTPATIENT)
Dept: PAIN MANAGEMENT | Facility: CLINIC | Age: 64
End: 2024-10-04
Payer: OTHER MISCELLANEOUS

## 2024-10-04 DIAGNOSIS — M17.11 UNILATERAL PRIMARY OSTEOARTHRITIS, RIGHT KNEE: ICD-10-CM

## 2024-10-04 DIAGNOSIS — M54.16 RADICULOPATHY, LUMBAR REGION: ICD-10-CM

## 2024-10-04 DIAGNOSIS — M17.12 UNILATERAL PRIMARY OSTEOARTHRITIS, LEFT KNEE: ICD-10-CM

## 2024-10-04 PROCEDURE — 99214 OFFICE O/P EST MOD 30 MIN: CPT

## 2024-10-05 NOTE — DISCUSSION/SUMMARY
[Medication Risks Reviewed] : Medication risks reviewed [de-identified] : Ms. Maloney is 64-year-old woman receiving active care for her chronic pain. She has constant head, face and nose pain secondary to a work-related injury that took place on 12/14/18. She reports falling and hitting her head and face against a wall in a bathroom at work. For symptom management, we prescribe her Tramadol 50mg PRN as it improves her quality of life. We will continue Gabapentin and Diclofenac Sodium 75mg BID for 30 days as well for pain relief. Overall, there is at least a 30-50% reduction in pain with the prescribed analgesics. The patient denies any adverse side effects due to the medication (sleeping disturbance, constipation, sleepiness, hallucinations and/or urination problems). She will follow up with us in 6 weeks.    I, Cm Sarmiento, attest that this documentation has been prepared under the direction and in the presence of Provider Maxim Rice, DO The documentation recorded by the scribe, in my presence, accurately reflects the service I personally performed, and the decisions made by me with my edits as appropriate.   Best Regards, Maxim Rice D.O.

## 2024-10-05 NOTE — HISTORY OF PRESENT ILLNESS
[FreeTextEntry1] : ORIGINAL PRESENTATION: This is a 64 year old woman with a chief complaint of head, face and nose pain which started after a work-related injury that took place on 12/14/18. She continues to have other areas of chronic pain secondary to her work related injury.   TODAY 10-: She continues to receive treatment for head, face and nose pain after a work-related injury that took place on 12/14/18. She reports falling and hitting her head and face against a wall in a bathroom at work. The patient reports loss of consciousness. An MRI of the brain taken 2/15/19 was normal and showed no abnormalities. She continues to have knee pain, she is being managed with tramadol, gabapentin and diclofenac.   Of note, patient had another work-related injury on 2/21/03 involving the left knee. She continues to receive care by Dr. Sotelo in orthopedics. She is status post a BL superior medial genicular, superior-lateral genicular and inferior medial geniculate nerve injection under fluoroscopy guidance on 09/19/24 which afforded her 80% sustained relief on the left knee to date. She notes her right knee is also with less intense pain, however she continues with 50-60% sustained relief on the right knee to date. She continues with right dull, achy knee pain worse with standing and walking, better with sitting without associated numbness, tingling or radicular pain. Pain is 5/10 in intensity. She has tried NSAIDs, PT/HEP without significant relief. Patient denies locking, clicking, instability, falls.  Of note, her low back is covered under her private insurance.

## 2024-10-05 NOTE — DISCUSSION/SUMMARY
[Medication Risks Reviewed] : Medication risks reviewed [de-identified] : Ms. Maloney is 64-year-old woman receiving active care for her chronic pain. She has constant head, face and nose pain secondary to a work-related injury that took place on 12/14/18. She reports falling and hitting her head and face against a wall in a bathroom at work. For symptom management, we prescribe her Tramadol 50mg PRN as it improves her quality of life. We will continue Gabapentin and Diclofenac Sodium 75mg BID for 30 days as well for pain relief. Overall, there is at least a 30-50% reduction in pain with the prescribed analgesics. The patient denies any adverse side effects due to the medication (sleeping disturbance, constipation, sleepiness, hallucinations and/or urination problems). She will follow up with us in 6 weeks.    I, Cm Sarmiento, attest that this documentation has been prepared under the direction and in the presence of Provider Maxim Rice, DO The documentation recorded by the scribe, in my presence, accurately reflects the service I personally performed, and the decisions made by me with my edits as appropriate.   Best Regards, Maxim Rice D.O.

## 2024-10-26 NOTE — H&P PST ADULT - TERM DELIVERIES, OB PROFILE
Ear Effusion/Infection    Amoxicillin twice a day for one week.    Flonase.    Warm fluids. Chewing gum.    Follow up if not improving.  Return to [facility] if worsening.   3

## 2024-10-28 ENCOUNTER — APPOINTMENT (OUTPATIENT)
Dept: OPHTHALMOLOGY | Facility: CLINIC | Age: 64
End: 2024-10-28
Payer: MEDICARE

## 2024-10-28 ENCOUNTER — NON-APPOINTMENT (OUTPATIENT)
Age: 64
End: 2024-10-28

## 2024-10-28 PROCEDURE — 92012 INTRM OPH EXAM EST PATIENT: CPT | Mod: 25

## 2024-10-28 PROCEDURE — 68761 CLOSE TEAR DUCT OPENING: CPT | Mod: E4

## 2024-10-28 PROCEDURE — 68761 CLOSE TEAR DUCT OPENING: CPT | Mod: E2

## 2024-11-11 NOTE — PHYSICAL THERAPY INITIAL EVALUATION ADULT - AMBULATION SKILLS, REHAB EVAL
Procedure confirmed  Colonoscopy     Via: Voice mail    Instructions given: Email     Prep Given: Miralax/Dulcolax    Call the office if there are any questions.       needs device/straight cane

## 2024-11-14 ENCOUNTER — APPOINTMENT (OUTPATIENT)
Dept: ORTHOPEDIC SURGERY | Facility: CLINIC | Age: 64
End: 2024-11-14
Payer: OTHER MISCELLANEOUS

## 2024-11-14 DIAGNOSIS — M17.12 UNILATERAL PRIMARY OSTEOARTHRITIS, LEFT KNEE: ICD-10-CM

## 2024-11-14 DIAGNOSIS — Z96.652 PAIN DUE TO INTERNAL ORTHOPEDIC PROSTHETIC DEVICES, IMPLANTS AND GRAFTS, SUBSEQUENT ENCOUNTER: ICD-10-CM

## 2024-11-14 DIAGNOSIS — M54.16 RADICULOPATHY, LUMBAR REGION: ICD-10-CM

## 2024-11-14 DIAGNOSIS — T84.84XD PAIN DUE TO INTERNAL ORTHOPEDIC PROSTHETIC DEVICES, IMPLANTS AND GRAFTS, SUBSEQUENT ENCOUNTER: ICD-10-CM

## 2024-11-14 DIAGNOSIS — M51.9 UNSPECIFIED THORACIC, THORACOLUMBAR AND LUMBOSACRAL INTERVERTEBRAL DISC DISORDER: ICD-10-CM

## 2024-11-14 PROCEDURE — 99213 OFFICE O/P EST LOW 20 MIN: CPT

## 2024-11-15 ENCOUNTER — APPOINTMENT (OUTPATIENT)
Dept: PAIN MANAGEMENT | Facility: CLINIC | Age: 64
End: 2024-11-15

## 2024-11-19 ENCOUNTER — APPOINTMENT (OUTPATIENT)
Dept: ORTHOPEDIC SURGERY | Facility: CLINIC | Age: 64
End: 2024-11-19
Payer: OTHER MISCELLANEOUS

## 2024-11-19 DIAGNOSIS — M17.11 UNILATERAL PRIMARY OSTEOARTHRITIS, RIGHT KNEE: ICD-10-CM

## 2024-11-19 PROCEDURE — 99215 OFFICE O/P EST HI 40 MIN: CPT

## 2024-12-03 ENCOUNTER — APPOINTMENT (OUTPATIENT)
Dept: PAIN MANAGEMENT | Facility: CLINIC | Age: 64
End: 2024-12-03
Payer: OTHER MISCELLANEOUS

## 2024-12-03 DIAGNOSIS — M17.12 UNILATERAL PRIMARY OSTEOARTHRITIS, LEFT KNEE: ICD-10-CM

## 2024-12-03 DIAGNOSIS — G89.29 PAIN IN RIGHT KNEE: ICD-10-CM

## 2024-12-03 DIAGNOSIS — M25.561 PAIN IN RIGHT KNEE: ICD-10-CM

## 2024-12-03 DIAGNOSIS — M25.562 PAIN IN RIGHT KNEE: ICD-10-CM

## 2024-12-03 DIAGNOSIS — M17.11 UNILATERAL PRIMARY OSTEOARTHRITIS, RIGHT KNEE: ICD-10-CM

## 2024-12-03 PROCEDURE — 99214 OFFICE O/P EST MOD 30 MIN: CPT

## 2024-12-03 RX ORDER — TRAMADOL HYDROCHLORIDE 50 MG/1
50 TABLET, COATED ORAL
Qty: 42 | Refills: 0 | Status: ACTIVE | COMMUNITY
Start: 2024-12-03 | End: 1900-01-01

## 2024-12-06 ENCOUNTER — RX RENEWAL (OUTPATIENT)
Age: 64
End: 2024-12-06

## 2024-12-31 ENCOUNTER — APPOINTMENT (OUTPATIENT)
Dept: PAIN MANAGEMENT | Facility: CLINIC | Age: 64
End: 2024-12-31
Payer: OTHER MISCELLANEOUS

## 2024-12-31 DIAGNOSIS — M17.12 UNILATERAL PRIMARY OSTEOARTHRITIS, LEFT KNEE: ICD-10-CM

## 2024-12-31 DIAGNOSIS — G89.29 PAIN IN RIGHT KNEE: ICD-10-CM

## 2024-12-31 DIAGNOSIS — M17.11 UNILATERAL PRIMARY OSTEOARTHRITIS, RIGHT KNEE: ICD-10-CM

## 2024-12-31 DIAGNOSIS — M25.562 PAIN IN RIGHT KNEE: ICD-10-CM

## 2024-12-31 DIAGNOSIS — M25.561 PAIN IN RIGHT KNEE: ICD-10-CM

## 2024-12-31 PROCEDURE — 99214 OFFICE O/P EST MOD 30 MIN: CPT

## 2025-01-22 ENCOUNTER — APPOINTMENT (OUTPATIENT)
Facility: CLINIC | Age: 65
End: 2025-01-22
Payer: OTHER MISCELLANEOUS

## 2025-01-22 ENCOUNTER — APPOINTMENT (OUTPATIENT)
Dept: PAIN MANAGEMENT | Facility: CLINIC | Age: 65
End: 2025-01-22
Payer: OTHER MISCELLANEOUS

## 2025-01-22 DIAGNOSIS — M17.12 UNILATERAL PRIMARY OSTEOARTHRITIS, LEFT KNEE: ICD-10-CM

## 2025-01-22 PROCEDURE — 64450 NJX AA&/STRD OTHER PN/BRANCH: CPT | Mod: 50

## 2025-01-22 PROCEDURE — 77002 NEEDLE LOCALIZATION BY XRAY: CPT | Mod: 79

## 2025-01-22 PROCEDURE — 01380 ANES ALL CLSD PX KNEE JOINT: CPT | Mod: QZ,P3

## 2025-01-28 ENCOUNTER — APPOINTMENT (OUTPATIENT)
Dept: PAIN MANAGEMENT | Facility: CLINIC | Age: 65
End: 2025-01-28
Payer: OTHER MISCELLANEOUS

## 2025-01-28 DIAGNOSIS — M25.562 PAIN IN RIGHT KNEE: ICD-10-CM

## 2025-01-28 DIAGNOSIS — G89.29 PAIN IN RIGHT KNEE: ICD-10-CM

## 2025-01-28 DIAGNOSIS — M17.11 UNILATERAL PRIMARY OSTEOARTHRITIS, RIGHT KNEE: ICD-10-CM

## 2025-01-28 DIAGNOSIS — M25.561 PAIN IN RIGHT KNEE: ICD-10-CM

## 2025-01-28 PROCEDURE — 99214 OFFICE O/P EST MOD 30 MIN: CPT

## 2025-01-28 RX ORDER — DIAZEPAM 5 MG/1
5 TABLET ORAL
Qty: 1 | Refills: 0 | Status: ACTIVE | COMMUNITY
Start: 2025-01-28 | End: 1900-01-01

## 2025-01-31 ENCOUNTER — APPOINTMENT (OUTPATIENT)
Dept: UROGYNECOLOGY | Facility: CLINIC | Age: 65
End: 2025-01-31
Payer: MEDICARE

## 2025-01-31 VITALS
SYSTOLIC BLOOD PRESSURE: 134 MMHG | HEIGHT: 64 IN | DIASTOLIC BLOOD PRESSURE: 82 MMHG | BODY MASS INDEX: 29.88 KG/M2 | WEIGHT: 175 LBS | HEART RATE: 70 BPM

## 2025-01-31 DIAGNOSIS — M62.89 OTHER SPECIFIED DISORDERS OF MUSCLE: ICD-10-CM

## 2025-01-31 DIAGNOSIS — N95.2 POSTMENOPAUSAL ATROPHIC VAGINITIS: ICD-10-CM

## 2025-01-31 DIAGNOSIS — R33.9 RETENTION OF URINE, UNSPECIFIED: ICD-10-CM

## 2025-01-31 DIAGNOSIS — N39.490 OVERFLOW INCONTINENCE: ICD-10-CM

## 2025-01-31 DIAGNOSIS — Z87.898 PERSONAL HISTORY OF OTHER SPECIFIED CONDITIONS: ICD-10-CM

## 2025-01-31 DIAGNOSIS — Z87.19 PERSONAL HISTORY OF OTHER DISEASES OF THE DIGESTIVE SYSTEM: ICD-10-CM

## 2025-01-31 PROCEDURE — 99214 OFFICE O/P EST MOD 30 MIN: CPT

## 2025-01-31 PROCEDURE — G2211 COMPLEX E/M VISIT ADD ON: CPT

## 2025-03-04 ENCOUNTER — APPOINTMENT (OUTPATIENT)
Dept: PAIN MANAGEMENT | Facility: CLINIC | Age: 65
End: 2025-03-04

## 2025-03-04 ENCOUNTER — APPOINTMENT (OUTPATIENT)
Dept: PULMONOLOGY | Facility: CLINIC | Age: 65
End: 2025-03-04
Payer: MEDICARE

## 2025-03-04 VITALS
WEIGHT: 175 LBS | OXYGEN SATURATION: 99 % | SYSTOLIC BLOOD PRESSURE: 120 MMHG | RESPIRATION RATE: 12 BRPM | HEART RATE: 70 BPM | DIASTOLIC BLOOD PRESSURE: 60 MMHG | HEIGHT: 64 IN | BODY MASS INDEX: 29.88 KG/M2

## 2025-03-04 DIAGNOSIS — G47.33 OBSTRUCTIVE SLEEP APNEA (ADULT) (PEDIATRIC): ICD-10-CM

## 2025-03-04 DIAGNOSIS — J45.909 UNSPECIFIED ASTHMA, UNCOMPLICATED: ICD-10-CM

## 2025-03-04 DIAGNOSIS — R93.89 ABNORMAL FINDINGS ON DIAGNOSTIC IMAGING OF OTHER SPECIFIED BODY STRUCTURES: ICD-10-CM

## 2025-03-04 PROCEDURE — 99214 OFFICE O/P EST MOD 30 MIN: CPT

## 2025-03-05 ENCOUNTER — APPOINTMENT (OUTPATIENT)
Dept: PAIN MANAGEMENT | Facility: CLINIC | Age: 65
End: 2025-03-05

## 2025-03-05 DIAGNOSIS — M17.11 UNILATERAL PRIMARY OSTEOARTHRITIS, RIGHT KNEE: ICD-10-CM

## 2025-03-05 PROCEDURE — 77002 NEEDLE LOCALIZATION BY XRAY: CPT

## 2025-03-05 PROCEDURE — 64450 NJX AA&/STRD OTHER PN/BRANCH: CPT | Mod: RT

## 2025-03-06 ENCOUNTER — APPOINTMENT (OUTPATIENT)
Dept: ORTHOPEDIC SURGERY | Facility: CLINIC | Age: 65
End: 2025-03-06
Payer: OTHER MISCELLANEOUS

## 2025-03-06 DIAGNOSIS — Z96.652 PAIN DUE TO INTERNAL ORTHOPEDIC PROSTHETIC DEVICES, IMPLANTS AND GRAFTS, SUBSEQUENT ENCOUNTER: ICD-10-CM

## 2025-03-06 DIAGNOSIS — M54.16 RADICULOPATHY, LUMBAR REGION: ICD-10-CM

## 2025-03-06 DIAGNOSIS — T84.84XD PAIN DUE TO INTERNAL ORTHOPEDIC PROSTHETIC DEVICES, IMPLANTS AND GRAFTS, SUBSEQUENT ENCOUNTER: ICD-10-CM

## 2025-03-06 DIAGNOSIS — M17.12 UNILATERAL PRIMARY OSTEOARTHRITIS, LEFT KNEE: ICD-10-CM

## 2025-03-06 DIAGNOSIS — M51.9 UNSPECIFIED THORACIC, THORACOLUMBAR AND LUMBOSACRAL INTERVERTEBRAL DISC DISORDER: ICD-10-CM

## 2025-03-06 PROCEDURE — 99213 OFFICE O/P EST LOW 20 MIN: CPT

## 2025-03-10 ENCOUNTER — OUTPATIENT (OUTPATIENT)
Dept: OUTPATIENT SERVICES | Facility: HOSPITAL | Age: 65
LOS: 1 days | End: 2025-03-10
Payer: MEDICARE

## 2025-03-10 ENCOUNTER — APPOINTMENT (OUTPATIENT)
Dept: PULMONOLOGY | Facility: HOSPITAL | Age: 65
End: 2025-03-10

## 2025-03-10 DIAGNOSIS — Z98.890 OTHER SPECIFIED POSTPROCEDURAL STATES: Chronic | ICD-10-CM

## 2025-03-10 DIAGNOSIS — R06.02 SHORTNESS OF BREATH: ICD-10-CM

## 2025-03-10 DIAGNOSIS — Z90.89 ACQUIRED ABSENCE OF OTHER ORGANS: Chronic | ICD-10-CM

## 2025-03-10 DIAGNOSIS — Z98.51 TUBAL LIGATION STATUS: Chronic | ICD-10-CM

## 2025-03-10 DIAGNOSIS — J34.2 DEVIATED NASAL SEPTUM: Chronic | ICD-10-CM

## 2025-03-10 PROCEDURE — 94070 EVALUATION OF WHEEZING: CPT

## 2025-03-10 PROCEDURE — 94729 DIFFUSING CAPACITY: CPT

## 2025-03-10 PROCEDURE — 94060 EVALUATION OF WHEEZING: CPT | Mod: 26

## 2025-03-10 PROCEDURE — 94729 DIFFUSING CAPACITY: CPT | Mod: 26

## 2025-03-10 PROCEDURE — 94726 PLETHYSMOGRAPHY LUNG VOLUMES: CPT

## 2025-03-10 PROCEDURE — 94664 DEMO&/EVAL PT USE INHALER: CPT

## 2025-03-10 PROCEDURE — 94727 GAS DIL/WSHOT DETER LNG VOL: CPT | Mod: 26

## 2025-03-11 DIAGNOSIS — R06.02 SHORTNESS OF BREATH: ICD-10-CM

## 2025-03-13 ENCOUNTER — APPOINTMENT (OUTPATIENT)
Facility: CLINIC | Age: 65
End: 2025-03-13

## 2025-03-18 ENCOUNTER — APPOINTMENT (OUTPATIENT)
Dept: PAIN MANAGEMENT | Facility: CLINIC | Age: 65
End: 2025-03-18
Payer: OTHER MISCELLANEOUS

## 2025-03-18 DIAGNOSIS — M17.11 UNILATERAL PRIMARY OSTEOARTHRITIS, RIGHT KNEE: ICD-10-CM

## 2025-03-18 DIAGNOSIS — M25.562 PAIN IN RIGHT KNEE: ICD-10-CM

## 2025-03-18 DIAGNOSIS — M17.12 UNILATERAL PRIMARY OSTEOARTHRITIS, LEFT KNEE: ICD-10-CM

## 2025-03-18 DIAGNOSIS — G89.29 PAIN IN RIGHT KNEE: ICD-10-CM

## 2025-03-18 DIAGNOSIS — M25.561 PAIN IN RIGHT KNEE: ICD-10-CM

## 2025-03-18 PROCEDURE — 99214 OFFICE O/P EST MOD 30 MIN: CPT

## 2025-03-24 ENCOUNTER — RX RENEWAL (OUTPATIENT)
Age: 65
End: 2025-03-24

## 2025-04-24 ENCOUNTER — APPOINTMENT (OUTPATIENT)
Dept: OPHTHALMOLOGY | Facility: CLINIC | Age: 65
End: 2025-04-24
Payer: MEDICARE

## 2025-04-24 ENCOUNTER — NON-APPOINTMENT (OUTPATIENT)
Age: 65
End: 2025-04-24

## 2025-04-24 PROCEDURE — 92133 CPTRZD OPH DX IMG PST SGM ON: CPT

## 2025-04-24 PROCEDURE — 68761 CLOSE TEAR DUCT OPENING: CPT | Mod: E2,E4

## 2025-04-24 PROCEDURE — 92014 COMPRE OPH EXAM EST PT 1/>: CPT | Mod: 25

## 2025-05-19 ENCOUNTER — RX RENEWAL (OUTPATIENT)
Age: 65
End: 2025-05-19

## 2025-05-29 ENCOUNTER — APPOINTMENT (OUTPATIENT)
Dept: PAIN MANAGEMENT | Facility: CLINIC | Age: 65
End: 2025-05-29
Payer: OTHER MISCELLANEOUS

## 2025-05-29 DIAGNOSIS — M17.12 UNILATERAL PRIMARY OSTEOARTHRITIS, LEFT KNEE: ICD-10-CM

## 2025-05-29 PROCEDURE — 64624 DSTRJ NULYT AGT GNCLR NRV: CPT | Mod: LT

## 2025-06-03 ENCOUNTER — APPOINTMENT (OUTPATIENT)
Dept: ORTHOPEDIC SURGERY | Facility: CLINIC | Age: 65
End: 2025-06-03

## 2025-06-10 ENCOUNTER — APPOINTMENT (OUTPATIENT)
Dept: PAIN MANAGEMENT | Facility: CLINIC | Age: 65
End: 2025-06-10
Payer: OTHER MISCELLANEOUS

## 2025-06-10 PROBLEM — M17.0 PRIMARY OSTEOARTHRITIS OF BOTH KNEES: Status: ACTIVE | Noted: 2025-06-10

## 2025-06-10 PROCEDURE — 99214 OFFICE O/P EST MOD 30 MIN: CPT

## 2025-06-10 RX ORDER — LIDOCAINE 5% 700 MG/1
5 PATCH TOPICAL
Qty: 1 | Refills: 1 | Status: ACTIVE | COMMUNITY
Start: 2025-06-10 | End: 1900-01-01

## 2025-07-02 RX ORDER — DIAZEPAM 5 MG/1
5 TABLET ORAL
Qty: 1 | Refills: 0 | Status: ACTIVE | COMMUNITY
Start: 2025-07-02 | End: 1900-01-01

## 2025-07-03 ENCOUNTER — APPOINTMENT (OUTPATIENT)
Dept: PAIN MANAGEMENT | Facility: CLINIC | Age: 65
End: 2025-07-03
Payer: OTHER MISCELLANEOUS

## 2025-07-03 PROCEDURE — 64624 DSTRJ NULYT AGT GNCLR NRV: CPT | Mod: RT

## 2025-07-08 ENCOUNTER — APPOINTMENT (OUTPATIENT)
Dept: UROGYNECOLOGY | Facility: CLINIC | Age: 65
End: 2025-07-08
Payer: MEDICARE

## 2025-07-08 VITALS
HEART RATE: 77 BPM | SYSTOLIC BLOOD PRESSURE: 137 MMHG | WEIGHT: 167 LBS | HEIGHT: 64 IN | DIASTOLIC BLOOD PRESSURE: 81 MMHG | BODY MASS INDEX: 28.51 KG/M2

## 2025-07-08 PROCEDURE — 99459 PELVIC EXAMINATION: CPT

## 2025-07-08 PROCEDURE — 99214 OFFICE O/P EST MOD 30 MIN: CPT | Mod: 25

## 2025-07-08 PROCEDURE — 51701 INSERT BLADDER CATHETER: CPT

## 2025-07-10 ENCOUNTER — APPOINTMENT (OUTPATIENT)
Dept: ORTHOPEDIC SURGERY | Facility: CLINIC | Age: 65
End: 2025-07-10
Payer: OTHER MISCELLANEOUS

## 2025-07-10 LAB — URINE CULTURE <10: NORMAL

## 2025-07-10 PROCEDURE — 99213 OFFICE O/P EST LOW 20 MIN: CPT

## 2025-07-15 ENCOUNTER — APPOINTMENT (OUTPATIENT)
Dept: PAIN MANAGEMENT | Facility: CLINIC | Age: 65
End: 2025-07-15
Payer: OTHER MISCELLANEOUS

## 2025-07-15 PROCEDURE — 99214 OFFICE O/P EST MOD 30 MIN: CPT

## 2025-07-15 RX ORDER — NAPROXEN 500 MG/1
500 TABLET ORAL
Qty: 60 | Refills: 1 | Status: ACTIVE | COMMUNITY
Start: 2025-07-15 | End: 1900-01-01

## 2025-07-22 ENCOUNTER — APPOINTMENT (OUTPATIENT)
Dept: PAIN MANAGEMENT | Facility: CLINIC | Age: 65
End: 2025-07-22

## 2025-09-04 ENCOUNTER — APPOINTMENT (OUTPATIENT)
Dept: PULMONOLOGY | Facility: CLINIC | Age: 65
End: 2025-09-04
Payer: MEDICARE

## 2025-09-04 ENCOUNTER — APPOINTMENT (OUTPATIENT)
Facility: CLINIC | Age: 65
End: 2025-09-04

## 2025-09-04 VITALS
HEART RATE: 61 BPM | WEIGHT: 161 LBS | RESPIRATION RATE: 12 BRPM | BODY MASS INDEX: 27.64 KG/M2 | DIASTOLIC BLOOD PRESSURE: 60 MMHG | SYSTOLIC BLOOD PRESSURE: 120 MMHG | OXYGEN SATURATION: 96 %

## 2025-09-04 DIAGNOSIS — J45.909 UNSPECIFIED ASTHMA, UNCOMPLICATED: ICD-10-CM

## 2025-09-04 DIAGNOSIS — G47.33 OBSTRUCTIVE SLEEP APNEA (ADULT) (PEDIATRIC): ICD-10-CM

## 2025-09-04 DIAGNOSIS — R93.89 ABNORMAL FINDINGS ON DIAGNOSTIC IMAGING OF OTHER SPECIFIED BODY STRUCTURES: ICD-10-CM

## 2025-09-04 PROCEDURE — G2211 COMPLEX E/M VISIT ADD ON: CPT

## 2025-09-04 PROCEDURE — 99214 OFFICE O/P EST MOD 30 MIN: CPT

## (undated) DEVICE — KIT CENTURION ANTERIOR

## (undated) DEVICE — PACK CENTURION 2.4MM

## (undated) DEVICE — KNIFE ALCON MVR V-LANCE 20G (WHITE)

## (undated) DEVICE — CANNULA IRR ALCON ANTERIOR CHAMBER 30G

## (undated) DEVICE — NUCLEUS HYDRODISSECTOR PEARCE ANGLED 25G X 22MM

## (undated) DEVICE — SUT ETHILON 10-0 12" CS160-6

## (undated) DEVICE — CANNULA IRR ANT CHAMBER 30G

## (undated) DEVICE — APPLICATOR COTTON TIP 3" STERILE

## (undated) DEVICE — VENODYNE/SCD SLEEVE CALF MEDIUM

## (undated) DEVICE — DRAPE MICROSCOPE KNOB COVER SMALL (2 PCS)

## (undated) DEVICE — GLV 6.5 PROTEXIS (BLUE)

## (undated) DEVICE — WARMING BLANKET LOWER ADULT

## (undated) DEVICE — SOL IRR BAG BSS 500ML

## (undated) DEVICE — CARTRIDGE PLATINUM

## (undated) DEVICE — PACK ANTERIOR SEGMENT